# Patient Record
Sex: FEMALE | Race: WHITE | NOT HISPANIC OR LATINO | Employment: UNEMPLOYED | URBAN - METROPOLITAN AREA
[De-identification: names, ages, dates, MRNs, and addresses within clinical notes are randomized per-mention and may not be internally consistent; named-entity substitution may affect disease eponyms.]

---

## 2022-07-28 ENCOUNTER — OFFICE VISIT (OUTPATIENT)
Dept: FAMILY MEDICINE CLINIC | Facility: CLINIC | Age: 32
End: 2022-07-28
Payer: COMMERCIAL

## 2022-07-28 VITALS
SYSTOLIC BLOOD PRESSURE: 110 MMHG | OXYGEN SATURATION: 98 % | WEIGHT: 248.8 LBS | DIASTOLIC BLOOD PRESSURE: 80 MMHG | HEIGHT: 68 IN | RESPIRATION RATE: 12 BRPM | BODY MASS INDEX: 37.71 KG/M2 | HEART RATE: 80 BPM

## 2022-07-28 DIAGNOSIS — E66.9 OBESITY (BMI 30-39.9): Primary | ICD-10-CM

## 2022-07-28 DIAGNOSIS — Z12.4 SCREENING FOR CERVICAL CANCER: ICD-10-CM

## 2022-07-28 PROCEDURE — 99204 OFFICE O/P NEW MOD 45 MIN: CPT | Performed by: INTERNAL MEDICINE

## 2022-07-28 PROCEDURE — 3725F SCREEN DEPRESSION PERFORMED: CPT | Performed by: INTERNAL MEDICINE

## 2022-07-28 NOTE — PATIENT INSTRUCTIONS
Find time for yourself  Start to walk at least 3 times per week 50 minutes with heart rate of 150  Limit your carbohydrates by 50%  Pasta, rice, bread, potato  No grapes  No sweets  Ice cream only on weekend for pleasure  Cannot eat 3 hours before you go to bed  In six-month your goal should be 220 lb

## 2022-07-28 NOTE — PROGRESS NOTES
Assessment/Plan:    No problem-specific Assessment & Plan notes found for this encounter  Diagnoses and all orders for this visit:    Obesity (BMI 30-39 9)  -     Comprehensive metabolic panel; Future  -     TSH, 3rd generation with Free T4 reflex; Future  -     CBC and differential; Future  -     Lipid panel; Future  -     Vitamin D 25 hydroxy; Future  -     UA (URINE) with reflex to Scope  -     HEMOGLOBIN A1C W/ EAG ESTIMATION; Future    Screening for cervical cancer  -     Ambulatory Referral to Gynecology; Future          Subjective:      Patient ID: Gwendolyn Ventura is a 33 y o  female  Patient came today to establish care  She does not have any medical conditions  She does not smoke, drinks alcohol socially  She is up-to-date on her vaccinations  Her vital signs are okay except of elevated BMI  She would like to establish care with OBGYN, referral was given  She has 2 kids, she is currently breast-feeding her 3years old  The following portions of the patient's history were reviewed and updated as appropriate: allergies, current medications, past family history, past medical history, past social history, past surgical history, and problem list     Review of Systems   Constitutional: Negative for activity change, appetite change, chills, fatigue and fever  HENT: Negative for congestion, ear pain, rhinorrhea and sore throat  Respiratory: Negative for cough, shortness of breath and wheezing  Cardiovascular: Negative for chest pain, palpitations and leg swelling  Gastrointestinal: Negative for abdominal distention, abdominal pain, diarrhea, nausea and vomiting  Genitourinary: Negative for difficulty urinating, frequency and pelvic pain  Musculoskeletal: Negative for arthralgias, back pain and neck pain  Skin: Negative for rash  Neurological: Negative for dizziness, tremors, weakness, numbness and headaches           Objective:      /80 (BP Location: Left arm, Patient Position: Sitting, Cuff Size: Standard)   Pulse 80   Resp 12   Ht 5' 8" (1 727 m)   Wt 113 kg (248 lb 12 8 oz)   SpO2 98%   BMI 37 83 kg/m²     Allergies   Allergen Reactions    Other Swelling     Whole wheat/grain products - can have regular white flour/grains        No current outpatient medications on file  Patient Instructions   Find time for yourself  Start to walk at least 3 times per week 50 minutes with heart rate of 150  Limit your carbohydrates by 50%  Pasta, rice, bread, potato  No grapes  No sweets  Ice cream only on weekend for pleasure  Cannot eat 3 hours before you go to bed  In six-month your goal should be 220 lb  Physical Exam  Constitutional:       General: She is not in acute distress  Appearance: Normal appearance  She is not ill-appearing  HENT:      Nose: No rhinorrhea  Cardiovascular:      Rate and Rhythm: Normal rate and regular rhythm  Heart sounds: No murmur heard  No friction rub  No gallop  Pulmonary:      Effort: No respiratory distress  Breath sounds: No wheezing or rhonchi  Chest:      Chest wall: No tenderness  Abdominal:      General: There is no distension  Palpations: There is no mass  Tenderness: There is no abdominal tenderness  There is no guarding or rebound  Hernia: No hernia is present  Musculoskeletal:         General: No swelling or tenderness  Lymphadenopathy:      Cervical: No cervical adenopathy  Skin:     Coloration: Skin is not jaundiced  Findings: No rash  Neurological:      Mental Status: She is alert and oriented to person, place, and time  Motor: No weakness        Gait: Gait normal    Psychiatric:         Mood and Affect: Mood normal          Behavior: Behavior normal

## 2023-03-28 PROBLEM — F41.9 ANXIETY: Status: ACTIVE | Noted: 2020-06-10

## 2023-03-28 NOTE — PROGRESS NOTES
"Subjective Margaret Dancer is a 33 y o  female who presents for annual well woman exam   Last Pap smear 10/13/18 NILM  Periods are regular every 28-30 days, lasting 5-7 days  Heavy for about 3 to 4 days  Changes menstrual cup about 2-3 times per day  Admits to increased fatigue, cramping and feels like she always gets sick during menses  No intermenstrual bleeding, spotting, or discharge  Current contraception: condoms  History of abnormal Pap smear: no  Family history of uterine or ovarian cancer: no  Regular self breast exam: no  History of abnormal mammogram: to begin at age 37 unless otherwise clinically indicated   Family history of breast cancer: yes - Mother BRCA positive unsure 1 or 2  History of abnormal lipids: no  Gardasil vaccine: Uncertain    Menstrual History:  OB History        3    Para   2    Term   2            AB   1    Living   2       SAB   1    IAB        Ectopic        Multiple        Live Births   2                Menarche age: 7  Patient's last menstrual period was 2023 (approximate)  Period Cycle (Days):  (monthly)  Period Duration (Days): 5-7  Period Pattern: Regular  Menstrual Flow: Heavy, Moderate (heavy for 3-4)  Menstrual Control: Other (Comment) (cup)  Menstrual Control Change Freq (Hours): cup-2-3 times a day  Dysmenorrhea: (!) Mild  Dysmenorrhea Symptoms: Headache, Cramping, Other (Comment)    The following portions of the patient's history were reviewed and updated as appropriate: allergies, current medications, past family history, past medical history, past social history, past surgical history and problem list     Review of Systems  Pertinent items are noted in HPI        Objective      /80   Ht 5' 8\" (1 727 m)   Wt 114 kg (251 lb)   LMP 2023 (Approximate)   BMI 38 16 kg/m²     General:   alert and oriented, in no acute distress, alert, morbidly obese, appears stated age and cooperative   Heart: regular rate and rhythm, " S1, S2 normal, no murmur, click, rub or gallop   Lungs: clear to auscultation bilaterally   Abdomen: soft, non-tender, without masses or organomegaly   Vulva: normal, Bartholin's, Urethra, [place]'s normal   Vagina: normal mucosa, normal discharge, no palpable nodules   Cervix: no bleeding following Pap, no cervical motion tenderness and no lesions   Uterus: Nontender difficult to assess due to body habitus   Adnexa: Nontender/difficult to assess due to body habitus   Breast: breasts appear normal, no suspicious masses, no skin or nipple changes or axillary nodes  Assessment      @well woman  no contraindication to begin hormonal therapy@   Plan      All questions answered  Await pap smear results  Blood tests: CBC with diff, TSH and Iron panel   Breast self exam technique reviewed and patient encouraged to perform self-exam monthly  Contraception: condoms  Diagnosis explained in detail, including differential   Dietary diary  Discussed healthy lifestyle modifications  Educational material distributed  Follow up in 1 Year for annual exam   Follow up as needed  Pelvic ultrasound  Thin prep Pap smear  Breast awareness reviewed    Family history of breast cancer-mother BRCA positive  Sister and brother BRCA positive  Information provided on genetic screening  Menorrhagia-pelvic ultrasound and labs ordered  Patient encouraged to complete labs and ultrasound  Reviewed options for menstrual control  Patient is most interested in NuvaRing  Rx NuvaRing 1 ring should remain in place for 3 consecutive weeks remove for fourth week for menses  Common side effects reviewed written information provided  We will follow-up in office in 3 to 4 months for symptom check  BMI 38 referral placed to weight management program  Encouraged healthy diet, exercise and lifestyle  Encouraged follow-up with PCP as needed  Encouraged seasonal influenza vaccination  Gardasil vaccine series reviewed    Written information provided  Encouraged social distancing, good hand hygiene, avoidance of crowds and masking  Written information provided about COVID-19    Will call/ PrintToPeerhart message  with results  VBI-    BMI Counseling: Body mass index is 38 16 kg/m²  The BMI is above normal  Nutrition recommendations include reducing portion sizes, decreasing overall calorie intake and 3-5 servings of fruits/vegetables daily  Exercise recommendations include exercising 3-5 times per week, joining a gym and strength training exercises  Patient referred to weight management due to patient being morbidly obese

## 2023-03-29 ENCOUNTER — APPOINTMENT (OUTPATIENT)
Dept: LAB | Facility: MEDICAL CENTER | Age: 33
End: 2023-03-29

## 2023-03-29 ENCOUNTER — OFFICE VISIT (OUTPATIENT)
Dept: OBGYN CLINIC | Facility: MEDICAL CENTER | Age: 33
End: 2023-03-29

## 2023-03-29 VITALS
WEIGHT: 251 LBS | HEIGHT: 68 IN | DIASTOLIC BLOOD PRESSURE: 80 MMHG | SYSTOLIC BLOOD PRESSURE: 122 MMHG | BODY MASS INDEX: 38.04 KG/M2

## 2023-03-29 DIAGNOSIS — Z30.8 ENCOUNTER FOR OTHER CONTRACEPTIVE MANAGEMENT: ICD-10-CM

## 2023-03-29 DIAGNOSIS — N92.0 MENORRHAGIA WITH REGULAR CYCLE: ICD-10-CM

## 2023-03-29 DIAGNOSIS — Z01.419 WELL WOMAN EXAM WITH ROUTINE GYNECOLOGICAL EXAM: Primary | ICD-10-CM

## 2023-03-29 PROBLEM — L98.9 SCALP LESION: Status: ACTIVE | Noted: 2023-03-29

## 2023-03-29 LAB
BASOPHILS # BLD AUTO: 0.04 THOUSANDS/ÂΜL (ref 0–0.1)
BASOPHILS NFR BLD AUTO: 1 % (ref 0–1)
EOSINOPHIL # BLD AUTO: 0.1 THOUSAND/ÂΜL (ref 0–0.61)
EOSINOPHIL NFR BLD AUTO: 1 % (ref 0–6)
ERYTHROCYTE [DISTWIDTH] IN BLOOD BY AUTOMATED COUNT: 17.8 % (ref 11.6–15.1)
FERRITIN SERPL-MCNC: 126 NG/ML (ref 8–388)
HCT VFR BLD AUTO: 34.7 % (ref 34.8–46.1)
HGB BLD-MCNC: 10.3 G/DL (ref 11.5–15.4)
IMM GRANULOCYTES # BLD AUTO: 0.03 THOUSAND/UL (ref 0–0.2)
IMM GRANULOCYTES NFR BLD AUTO: 0 % (ref 0–2)
LYMPHOCYTES # BLD AUTO: 2.55 THOUSANDS/ÂΜL (ref 0.6–4.47)
LYMPHOCYTES NFR BLD AUTO: 30 % (ref 14–44)
MCH RBC QN AUTO: 18.5 PG (ref 26.8–34.3)
MCHC RBC AUTO-ENTMCNC: 29.7 G/DL (ref 31.4–37.4)
MCV RBC AUTO: 62 FL (ref 82–98)
MONOCYTES # BLD AUTO: 0.5 THOUSAND/ÂΜL (ref 0.17–1.22)
MONOCYTES NFR BLD AUTO: 6 % (ref 4–12)
NEUTROPHILS # BLD AUTO: 5.39 THOUSANDS/ÂΜL (ref 1.85–7.62)
NEUTS SEG NFR BLD AUTO: 62 % (ref 43–75)
NRBC BLD AUTO-RTO: 0 /100 WBCS
PLATELET # BLD AUTO: 312 THOUSANDS/UL (ref 149–390)
PMV BLD AUTO: 10.8 FL (ref 8.9–12.7)
RBC # BLD AUTO: 5.57 MILLION/UL (ref 3.81–5.12)
TSH SERPL DL<=0.05 MIU/L-ACNC: 3.66 UIU/ML (ref 0.45–4.5)
WBC # BLD AUTO: 8.61 THOUSAND/UL (ref 4.31–10.16)

## 2023-03-29 RX ORDER — ETONOGESTREL AND ETHINYL ESTRADIOL 11.7; 2.7 MG/1; MG/1
INSERT, EXTENDED RELEASE VAGINAL
Qty: 3 EACH | Refills: 1 | Status: SHIPPED | OUTPATIENT
Start: 2023-03-29

## 2023-03-31 LAB
HPV HR 12 DNA CVX QL NAA+PROBE: NEGATIVE
HPV16 DNA CVX QL NAA+PROBE: NEGATIVE
HPV18 DNA CVX QL NAA+PROBE: NEGATIVE
IRON SATN MFR SERPL: 29 % (ref 15–50)
IRON SERPL-MCNC: 90 UG/DL (ref 50–170)
TIBC SERPL-MCNC: 309 UG/DL (ref 250–450)

## 2023-04-07 LAB
LAB AP GYN PRIMARY INTERPRETATION: NORMAL
LAB AP LMP: NORMAL
Lab: NORMAL

## 2023-06-26 NOTE — PROGRESS NOTES
"Assessment/Plan:      Diagnoses and all orders for this visit:    Encounter for surveillance of vaginal ring hormonal contraceptive device  -     etonogestrel-ethinyl estradiol (NuvaRing) 0 12-0 015 MG/24HR vaginal ring; Insert vaginally and leave in place for 3 consecutive weeks, then remove for 1 week  Menorrhagia with regular cycle  -     etonogestrel-ethinyl estradiol (NuvaRing) 0 12-0 015 MG/24HR vaginal ring; Insert vaginally and leave in place for 3 consecutive weeks, then remove for 1 week  - reviewed use of nuvaring  Written information provided  - reviewed menorrhagia and treatment options  - encouraged to complete pelvic US  -Signs and symptoms to report reviewed    RTO 3/2024 for annual exam     Subjective:     Patient ID: Benjamin Jiménez is a 29 y o  female  HPI  here to follow up menorrhagia  LMP 23  Was seen 2023 with complaints of heavy menstrual bleeding  Started Nuvaring  Menses are every 3 months lasting 5 to 7 days  Bleeding is described as lighter  Is satisfied and desires to continue  Has not completed pelvic US  Last pap smear 3/29/23 NILM/ HR HPV(-)       Review of Systems   Constitutional: Negative for chills, fatigue and fever  Respiratory: Negative  Cardiovascular: Negative  Genitourinary: Negative  Neurological: Negative for headaches  Objective:  /74   Ht 5' 8\" (1 727 m)   Wt 110 kg (243 lb 6 4 oz)   LMP 2023 (Exact Date) Comment: nuvaring  BMI 37 01 kg/m²      Physical Exam  Vitals reviewed  Constitutional:       Appearance: Normal appearance  She is obese  Neurological:      Mental Status: She is alert and oriented to person, place, and time     Psychiatric:         Mood and Affect: Mood normal          Behavior: Behavior normal          "

## 2023-06-28 ENCOUNTER — OFFICE VISIT (OUTPATIENT)
Dept: OBGYN CLINIC | Facility: MEDICAL CENTER | Age: 33
End: 2023-06-28
Payer: COMMERCIAL

## 2023-06-28 VITALS
WEIGHT: 243.4 LBS | BODY MASS INDEX: 36.89 KG/M2 | SYSTOLIC BLOOD PRESSURE: 126 MMHG | HEIGHT: 68 IN | DIASTOLIC BLOOD PRESSURE: 74 MMHG

## 2023-06-28 DIAGNOSIS — Z30.44 ENCOUNTER FOR SURVEILLANCE OF VAGINAL RING HORMONAL CONTRACEPTIVE DEVICE: ICD-10-CM

## 2023-06-28 DIAGNOSIS — N92.0 MENORRHAGIA WITH REGULAR CYCLE: ICD-10-CM

## 2023-06-28 PROCEDURE — 99213 OFFICE O/P EST LOW 20 MIN: CPT | Performed by: NURSE PRACTITIONER

## 2023-06-28 RX ORDER — ETONOGESTREL AND ETHINYL ESTRADIOL 11.7; 2.7 MG/1; MG/1
INSERT, EXTENDED RELEASE VAGINAL
Qty: 3 EACH | Refills: 3 | Status: SHIPPED | OUTPATIENT
Start: 2023-06-28

## 2023-10-03 ENCOUNTER — TELEPHONE (OUTPATIENT)
Dept: OBGYN CLINIC | Facility: MEDICAL CENTER | Age: 33
End: 2023-10-03

## 2023-10-03 NOTE — TELEPHONE ENCOUNTER
Discussed with provider and patient does not have to remove her nuvaring. Patient aware. Patient advised to call back with any questions or any concerns. Bleeding precautions reviewed.

## 2023-10-03 NOTE — TELEPHONE ENCOUNTER
Patient called stating that she is having a "mini period". Started 3 days ago. She would like to know if she should remove the Nuva Ring?  Please advise

## 2023-11-13 ENCOUNTER — OFFICE VISIT (OUTPATIENT)
Dept: URGENT CARE | Facility: MEDICAL CENTER | Age: 33
End: 2023-11-13
Payer: COMMERCIAL

## 2023-11-13 ENCOUNTER — APPOINTMENT (OUTPATIENT)
Dept: RADIOLOGY | Facility: MEDICAL CENTER | Age: 33
End: 2023-11-13
Payer: COMMERCIAL

## 2023-11-13 VITALS
HEIGHT: 68 IN | WEIGHT: 232.2 LBS | BODY MASS INDEX: 35.19 KG/M2 | HEART RATE: 87 BPM | DIASTOLIC BLOOD PRESSURE: 91 MMHG | TEMPERATURE: 98 F | OXYGEN SATURATION: 100 % | SYSTOLIC BLOOD PRESSURE: 146 MMHG
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
## 2023-11-13 DIAGNOSIS — R05.9 COUGH, UNSPECIFIED TYPE: ICD-10-CM

## 2023-11-13 DIAGNOSIS — J45.909 REACTIVE AIRWAY DISEASE WITHOUT COMPLICATION, UNSPECIFIED ASTHMA SEVERITY, UNSPECIFIED WHETHER PERSISTENT: ICD-10-CM

## 2023-11-13 PROCEDURE — 71046 X-RAY EXAM CHEST 2 VIEWS: CPT

## 2023-11-13 PROCEDURE — 99213 OFFICE O/P EST LOW 20 MIN: CPT | Performed by: FAMILY MEDICINE

## 2023-11-13 RX ORDER — BENZONATATE 200 MG/1
200 CAPSULE ORAL 3 TIMES DAILY PRN
Qty: 20 CAPSULE | Refills: 0 | Status: SHIPPED | OUTPATIENT
Start: 2023-11-13

## 2023-11-13 RX ORDER — PREDNISONE 20 MG/1
TABLET ORAL
Qty: 18 TABLET | Refills: 0 | Status: SHIPPED | OUTPATIENT
Start: 2023-11-13

## 2023-11-13 RX ORDER — ALBUTEROL SULFATE 90 UG/1
2 AEROSOL, METERED RESPIRATORY (INHALATION) EVERY 6 HOURS PRN
Qty: 19 G | Refills: 0 | Status: SHIPPED | OUTPATIENT
Start: 2023-11-13 | End: 2023-11-16 | Stop reason: SDUPTHER

## 2023-11-14 NOTE — PROGRESS NOTES
[facility]        NAME: Shakir Simon is a 29 y.o. female  : 1990    MRN: [id number]  DATE: 2023  TIME: 8:11 PM    Assessment and Plan   Cat bite, initial encounter [W55.01XA]  1. Cat bite, initial encounter        2. Cough, unspecified type  XR chest pa & lateral            Patient Instructions       Follow up with PCP in 3-5 days. Proceed to  ER if symptoms worsen. Chief Complaint     Chief Complaint   Patient presents with    Cough     Patient reports pain in her chest when she coughs, fatigue, and dizziness. History of Present Illness       69-year-old female here today with complaint of cough for the past week. Complaining of pleuritic chest pain. Cough is predominant worse at night. Denies any fever. Complaining of extreme fatigue in the last 3 days. She has been taking Robitussin and recent switch to Mucinex which has offered no significant improvement. Refers to minimal nasal congestion denies postnasal drip. Denies nausea, vomiting, diarrhea. However, she informs she has sick exposure to her children have been sick recently. She performed a COVID test at home today which was negative. Currently immunized against COVID but has not received a booster shot this year. She is also not received flu vaccine this season. Complaining of generalized fatigue. Denies wheezing but complains of severe shortness of breath. Review of Systems   Review of Systems   Constitutional:  Positive for fatigue. HENT:  Positive for congestion. Negative for postnasal drip. Respiratory:  Positive for cough and shortness of breath. Neurological:  Positive for headaches.          Current Medications       Current Outpatient Medications:     diphenhydrAMINE HCl (BENADRYL ALLERGY PO), Take by mouth as needed, Disp: , Rfl:     etonogestrel-ethinyl estradiol (NuvaRing) 0.12-0.015 MG/24HR vaginal ring, Insert vaginally and leave in place for 3 consecutive weeks, then remove for 1 week., Disp: 3 each, Rfl: 3    fluticasone (FLONASE) 50 mcg/act nasal spray, SPRAY 1 SPRAY INTO EACH NOSTRIL EVERY DAY, Disp: 48 mL, Rfl: 3    mometasone (ELOCON) 0.1 % lotion, Apply topically daily, Disp: 60 mL, Rfl: 1    Auvi-Q 0.3 MG/0.3ML SOAJ, Inject 0.3 mL (0.3 mg total) into a muscle once for 1 dose, Disp: 6 each, Rfl: 0    Current Allergies     Allergies as of 11/13/2023 - Reviewed 11/13/2023   Allergen Reaction Noted    Other Swelling 06/22/2018            The following portions of the patient's history were reviewed and updated as appropriate: allergies, current medications, past family history, past medical history, past social history, past surgical history and problem list.     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[address])        Past Surgical History:   Procedure Laterality Date    MOLE REMOVAL         Family History   Problem Relation Age of Onset    Breast cancer Mother         BRCA +    Diabetes Mother     Rashes / Skin problems Mother         sores d/t scratching    Allergy (severe) Mother         food and env    Depression Father     Bipolar disorder Father     Polycystic ovary syndrome Sister     Anxiety disorder Sister     Other Sister         BRCA +    Allergies Sister         soy allergy    Diabetes Brother     Bipolar disorder Brother     Other Brother         BRCA +    Autism Brother     Allergies Brother         food and env. Asthma Brother     Stroke Maternal Grandmother     Heart disease Paternal Grandmother     Asthma Son     Allergies Son     Colon cancer Neg Hx     Ovarian cancer Neg Hx          Medications have been verified. Objective   /91   Pulse 87   Temp 98 °F (36.7 °C)   Ht 5' 8" (1.727 m)   Wt 105 kg (232 lb 3.2 oz)   SpO2 100%   BMI 35.31 kg/m²   No LMP recorded. (Menstrual status: Birth Control). Physical Exam     Physical Exam  Vitals and nursing note reviewed. Constitutional:       Appearance: She is ill-appearing. HENT:      Nose:      Comments: Erythematous mildly hypertrophic right turbinate. Mouth/Throat:      Mouth: Mucous membranes are moist.      Pharynx: Oropharynx is clear. Pulmonary:      Effort: Pulmonary effort is normal.      Comments: Decreased breath sounds. Musculoskeletal:      Cervical back: Normal range of motion and neck supple. Neurological:      Mental Status: She is alert. - - -

## 2023-11-14 NOTE — PATIENT INSTRUCTIONS
Chest x-ray reveals no infiltrate effusion or consolidation. Official radiology interpretation is pending. I prescribed Proventil inhaler 2 puffs every 6 hours for 7 days then use as needed, prednisone tapering from 60 down to 20 mg over 9 days, Tessalon Perles 200 mg every 8 hour for cough suppression. For now, continue with Mucinex for expectoration. Follow-up with primary care provider if symptoms persist or worsen. Asthma   WHAT YOU NEED TO KNOW:   Asthma is a lung disease that makes breathing difficult. Chronic inflammation and reactions to triggers narrow the airways in the lungs. Asthma can become life-threatening if it is not managed. DISCHARGE INSTRUCTIONS:   Call your local emergency number (911 in the [address]) if:   You have severe shortness of breath. The skin around your neck and ribs pulls in with each breath. Your peak flow numbers are in the red zone of your AAP. Return to the emergency department if:   You have shortness of breath, even after you take your short-term medicine as directed. Your lips or nails turn blue or gray. Call your doctor or asthma specialist if:   You run out of medicine before your next refill is due. Your symptoms get worse. You need to take more medicine than usual to control your symptoms. You have questions or concerns about your condition or care. Medicines:   Medicines  may be used to decrease inflammation, open airways, and make it easier to breathe. Medicines may be inhaled, taken as a pill, or injected. Short-term medicines relieve your symptoms quickly. Long-term medicines are used to prevent future asthma attacks. Other medicines may be needed if your regular medicines are not able to prevent attacks. You may also need medicine to help control your allergies. Ask your healthcare provider for more information about any medicine you are given and how to take it safely. Take your medicine as directed.   Contact your healthcare provider if you think your medicine is not helping or if you have side effects. Tell your provider if you are allergic to any medicine. Keep a list of the medicines, vitamins, and herbs you take. Include the amounts, and when and why you take them. Bring the list or the pill bottles to follow-up visits. Carry your medicine list with you in case of an emergency. Manage your symptoms and prevent future attacks: Follow your Asthma Action Plan (AAP). This is a written plan that you and your healthcare provider create. It explains which medicine you need and when to change doses if necessary. It also explains how you can monitor symptoms and use a peak flow meter. The meter measures how well your lungs are working. Manage other health conditions , such as allergies, acid reflux, and sleep apnea. Identify and avoid triggers. These may include pets, dust mites, mold, and cockroaches. Do not smoke or be around others who smoke. Nicotine and other chemicals in cigarettes and cigars can cause lung damage. Ask your healthcare provider for information if you currently smoke and need help to quit. E-cigarettes or smokeless tobacco still contain nicotine. Talk to your healthcare provider before you use these products. Ask about the flu vaccine. The flu can make your asthma worse. You may need a yearly flu shot. Follow up with your healthcare provider as directed: You will need to return to make sure your medicine is working and your symptoms are controlled. You may be referred to an asthma or allergy specialist. Deisy Lee may be asked to keep a record of your peak flow values and bring it with you to your appointments. Write down your questions so you remember to ask them during your visits. © Copyright Leonidas Dudley 2023 Information is for End User's use only and may not be sold, redistributed or otherwise used for commercial purposes. The above information is an  only.  It is not intended as medical advice for individual conditions or treatments. Talk to your doctor, nurse or pharmacist before following any medical regimen to see if it is safe and effective for you.

## 2024-01-29 ENCOUNTER — OFFICE VISIT (OUTPATIENT)
Dept: FAMILY MEDICINE CLINIC | Facility: CLINIC | Age: 34
End: 2024-01-29
Payer: COMMERCIAL

## 2024-01-29 VITALS — DIASTOLIC BLOOD PRESSURE: 76 MMHG | OXYGEN SATURATION: 98 % | HEART RATE: 92 BPM | SYSTOLIC BLOOD PRESSURE: 132 MMHG

## 2024-01-29 DIAGNOSIS — J01.00 ACUTE NON-RECURRENT MAXILLARY SINUSITIS: Primary | ICD-10-CM

## 2024-01-29 PROBLEM — J45.20 MILD INTERMITTENT ASTHMA WITHOUT COMPLICATION: Status: ACTIVE | Noted: 2024-01-29

## 2024-01-29 PROCEDURE — 99213 OFFICE O/P EST LOW 20 MIN: CPT | Performed by: FAMILY MEDICINE

## 2024-01-29 RX ORDER — BUDESONIDE AND FORMOTEROL FUMARATE DIHYDRATE 160; 4.5 UG/1; UG/1
2 AEROSOL RESPIRATORY (INHALATION) 2 TIMES DAILY
COMMUNITY
End: 2024-02-01

## 2024-01-29 RX ORDER — AMOXICILLIN AND CLAVULANATE POTASSIUM 875; 125 MG/1; MG/1
1 TABLET, FILM COATED ORAL EVERY 12 HOURS SCHEDULED
Qty: 14 TABLET | Refills: 0 | Status: SHIPPED | OUTPATIENT
Start: 2024-01-29 | End: 2024-02-05

## 2024-01-29 NOTE — PATIENT INSTRUCTIONS
1. Acute non-recurrent maxillary sinusitis  -     amoxicillin-clavulanate (AUGMENTIN) 875-125 mg per tablet; Take 1 tablet by mouth every 12 (twelve) hours for 7 days     Start antibiotics and flonase call if symptoms do not resolve.

## 2024-01-29 NOTE — PROGRESS NOTES
Assessment/Plan:       Problem List Items Addressed This Visit    None  Visit Diagnoses       Acute non-recurrent maxillary sinusitis    -  Primary    Relevant Medications    amoxicillin-clavulanate (AUGMENTIN) 875-125 mg per tablet          1. Acute non-recurrent maxillary sinusitis  Start augmentin and flonase, follow up if symptoms not resolving.    - amoxicillin-clavulanate (AUGMENTIN) 875-125 mg per tablet; Take 1 tablet by mouth every 12 (twelve) hours for 7 days  Dispense: 14 tablet; Refill: 0      Subjective:      Patient ID: Elena Augustine is a 33 y.o. female.    HPI    33 year old presenting for ear pain, congestion for about 6 weeks off and on now worsening.    Feels as if ears are underwater.    Has been afebrile.    The following portions of the patient's history were reviewed and updated as appropriate: allergies, current medications, past family history, past medical history, past social history, past surgical history and problem list.      Current Outpatient Medications:     albuterol (PROVENTIL HFA,VENTOLIN HFA) 90 mcg/act inhaler, Inhale 2 puffs every 4 (four) hours as needed for wheezing, Disp: 19 g, Rfl: 0    amoxicillin-clavulanate (AUGMENTIN) 875-125 mg per tablet, Take 1 tablet by mouth every 12 (twelve) hours for 7 days, Disp: 14 tablet, Rfl: 0    budesonide-formoterol (SYMBICORT) 160-4.5 mcg/act inhaler, Inhale 2 puffs 2 (two) times a day Rinse mouth after use., Disp: , Rfl:     etonogestrel-ethinyl estradiol (NuvaRing) 0.12-0.015 MG/24HR vaginal ring, Insert vaginally and leave in place for 3 consecutive weeks, then remove for 1 week., Disp: 3 each, Rfl: 3    Spacer/Aero-Holding Chambers (Vortex Valved Holding Chamber) WAQAS, Use 2 (two) times a day, Disp: 1 each, Rfl: 0    Auvi-Q 0.3 MG/0.3ML SOAJ, Inject 0.3 mL (0.3 mg total) into a muscle once for 1 dose (Patient not taking: Reported on 1/29/2024), Disp: 6 each, Rfl: 0    benzonatate (TESSALON) 200 MG capsule, Take 1 capsule (200 mg  total) by mouth 3 (three) times a day as needed for cough (Patient not taking: Reported on 1/29/2024), Disp: 20 capsule, Rfl: 0    diphenhydrAMINE HCl (BENADRYL ALLERGY PO), Take by mouth as needed (Patient not taking: Reported on 1/29/2024), Disp: , Rfl:     fluticasone (FLONASE) 50 mcg/act nasal spray, SPRAY 1 SPRAY INTO EACH NOSTRIL EVERY DAY (Patient not taking: Reported on 11/16/2023), Disp: 48 mL, Rfl: 3    Fluticasone-Salmeterol (Wixela Inhub) 250-50 mcg/dose inhaler, Inhale 1 puff 2 (two) times a day as needed (cough, wheeze) (Patient not taking: Reported on 1/29/2024), Disp: 60 blister, Rfl: 1    mometasone (ELOCON) 0.1 % lotion, Apply topically daily (Patient not taking: Reported on 1/29/2024), Disp: 60 mL, Rfl: 1    predniSONE 20 mg tablet, 3 tablets once daily day 1 through 3 then 2 tablets daily day 4 through 6 then 1 tablet daily day 7 through 9. (Patient not taking: Reported on 1/29/2024), Disp: 18 tablet, Rfl: 0     Review of Systems   Constitutional:  Negative for activity change and appetite change.   Respiratory:  Negative for apnea and chest tightness.    Cardiovascular:  Negative for chest pain and palpitations.   Gastrointestinal:  Negative for abdominal distention and abdominal pain.   Musculoskeletal:  Negative for arthralgias and back pain.         Objective:      /76 (BP Location: Left arm, Patient Position: Sitting, Cuff Size: Large)   Pulse 92   SpO2 98%          Physical Exam  Constitutional:       Appearance: She is well-developed.   HENT:      Right Ear: Tympanic membrane and ear canal normal.      Left Ear: Tympanic membrane and ear canal normal.      Nose: Congestion and rhinorrhea present.      Right Sinus: Maxillary sinus tenderness present. No frontal sinus tenderness.      Left Sinus: Maxillary sinus tenderness present. No frontal sinus tenderness.   Cardiovascular:      Rate and Rhythm: Normal rate and regular rhythm.   Pulmonary:      Effort: Pulmonary effort is normal.    Neurological:      Mental Status: She is alert.           Adam Huston MD

## 2024-02-28 ENCOUNTER — TELEPHONE (OUTPATIENT)
Dept: RHEUMATOLOGY | Facility: CLINIC | Age: 34
End: 2024-02-28

## 2024-03-19 ENCOUNTER — OFFICE VISIT (OUTPATIENT)
Dept: RHEUMATOLOGY | Facility: CLINIC | Age: 34
End: 2024-03-19
Payer: COMMERCIAL

## 2024-03-19 ENCOUNTER — APPOINTMENT (OUTPATIENT)
Dept: LAB | Facility: CLINIC | Age: 34
End: 2024-03-19
Payer: COMMERCIAL

## 2024-03-19 VITALS
DIASTOLIC BLOOD PRESSURE: 82 MMHG | WEIGHT: 227 LBS | HEIGHT: 68 IN | HEART RATE: 91 BPM | OXYGEN SATURATION: 99 % | BODY MASS INDEX: 34.4 KG/M2 | SYSTOLIC BLOOD PRESSURE: 126 MMHG

## 2024-03-19 DIAGNOSIS — K90.0 CELIAC DISEASE: Primary | ICD-10-CM

## 2024-03-19 DIAGNOSIS — K90.0 CELIAC DISEASE: ICD-10-CM

## 2024-03-19 DIAGNOSIS — M25.50 ARTHRALGIA, UNSPECIFIED JOINT: ICD-10-CM

## 2024-03-19 PROCEDURE — 86705 HEP B CORE ANTIBODY IGM: CPT

## 2024-03-19 PROCEDURE — 86704 HEP B CORE ANTIBODY TOTAL: CPT

## 2024-03-19 PROCEDURE — 86803 HEPATITIS C AB TEST: CPT

## 2024-03-19 PROCEDURE — 99204 OFFICE O/P NEW MOD 45 MIN: CPT | Performed by: INTERNAL MEDICINE

## 2024-03-19 PROCEDURE — 86200 CCP ANTIBODY: CPT

## 2024-03-19 PROCEDURE — 86430 RHEUMATOID FACTOR TEST QUAL: CPT

## 2024-03-19 PROCEDURE — 36415 COLL VENOUS BLD VENIPUNCTURE: CPT

## 2024-03-19 PROCEDURE — 86364 TISS TRNSGLTMNASE EA IG CLAS: CPT

## 2024-03-19 PROCEDURE — 87340 HEPATITIS B SURFACE AG IA: CPT

## 2024-03-19 NOTE — PROGRESS NOTES
"  This is a Rheumatology Consult seen at the request of Dr. House      HPI: Elena Augustine is a 32 y/o female who presents for further evaluation chronic arthralgias. She has past medical history food allergy ( wheat allergy)    Onset of symptoms since her 20s    She has history food allergy, possibly wheat allergy with \"throat closing\". She does not have reaction with processed wheat, however she reacts to whole wheat products. Was rxd Epi Pen    Also develops scalp lesions, diarrhea, arthralgias associated with wheat products    Seasonal allergic rhinitis, Rxd with flonase    She has stopped gluten and has noticed improvement diarrhea, rash    Arthralgias are somewhat better                  --------------------------------------------------------------------------------------------------------        ROS:        All other ROS was reviewed and negative except as above         --------------------------------------------------------------------------------------------------------    Past Medical History    Past Medical History:   Diagnosis Date    Blood in urine     Diabetes (HCC)     in pregnancy    Food intolerance     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)            Past Surgical History    Past Surgical History:   Procedure Laterality Date    MOLE REMOVAL             Family History    Family History   Problem Relation Age of Onset    Breast cancer Mother         BRCA +    Diabetes Mother     Rashes / Skin problems Mother         sores d/t scratching    Allergy (severe) Mother         food and env    Depression Father     Bipolar disorder Father     Polycystic ovary syndrome Sister     Anxiety disorder Sister     Other Sister         BRCA +    Allergies Sister         soy allergy    Diabetes Brother     Bipolar disorder Brother     Other Brother         BRCA +    Autism Brother     Allergies Brother         food and env.    Asthma Brother     Stroke Maternal Grandmother     " "Heart disease Paternal Grandmother     Asthma Son     Allergies Son     Colon cancer Neg Hx     Ovarian cancer Neg Hx             Social History    Social History     Tobacco Use    Smoking status: Never    Smokeless tobacco: Never   Vaping Use    Vaping status: Never Used   Substance Use Topics    Alcohol use: Yes     Comment: social    Drug use: Never     Currently not working outside home    Allergies    Allergies   Allergen Reactions    Other Swelling     Whole wheat/grain products - can have regular white flour/grains         Medications    Current Outpatient Medications   Medication Instructions    albuterol (PROVENTIL HFA,VENTOLIN HFA) 90 mcg/act inhaler 2 puffs, Inhalation, Every 4 hours PRN    Auvi-Q 0.3 mg, Intramuscular, Once    benzonatate (TESSALON) 200 mg, Oral, 3 times daily PRN    diphenhydrAMINE HCl (BENADRYL ALLERGY PO) Oral, As needed    etonogestrel-ethinyl estradiol (NuvaRing) 0.12-0.015 MG/24HR vaginal ring Insert vaginally and leave in place for 3 consecutive weeks, then remove for 1 week.    fluticasone (FLONASE) 50 mcg/act nasal spray SPRAY 1 SPRAY INTO EACH NOSTRIL EVERY DAY    Fluticasone-Salmeterol (Wixela Inhub) 250-50 mcg/dose inhaler 1 puff, Inhalation, 2 times daily PRN    mometasone (ELOCON) 0.1 % lotion Topical, Daily    predniSONE 20 mg tablet 3 tablets once daily day 1 through 3 then 2 tablets daily day 4 through 6 then 1 tablet daily day 7 through 9.    Spacer/Aero-Holding Chambers (Vortex Valved Holding Chamber) WAQAS Does not apply, 2 times daily          Physical Exam    /82   Pulse 91   Ht 5' 8\" (1.727 m)   Wt 103 kg (227 lb)   SpO2 99%   BMI 34.52 kg/m²     GEN: AAO, No apparent distress.  Patient is well developed.  HEENT:  Pupils are equal, round and reactive.  Sclera are clear.  Fundoscopic exam is normal.  External ears are without lesions.  Oral pharynx is clear of ulcers or other lesions.  MMM.   NECK:  Supple.  There is no adenopathy appreciable in anterior " or posterior cervical chains or supraclavicularly.  JVP is normal.    HEART: Regular rate and rhythm.  There is no appreciable murmur, gallop or rub.  LUNGS: Clear to auscultation.  ABD:  Soft, without tenderness, rebound or guarding.  No appreciable organomegally.  NEURO: Speech and cognition are normal.  Strength is 5/5 throughout.  Tone is normal.  DTRs are 2/4 at the knees, ankles and elbows.  Gait is normal.  SKIN: There are no rashes or lesions    MUSCULOSKELETAL:   -Hands: normal  -Wrists: normal  -Elbows: normal  -Shoulders: normal  -Neck: normal  -Back: normal  -Hips: normal  -Knees: normal  -Ankles: normal  -Feet: normal      ________________________________________________________________________          Results Review    Component      Latest Ref [facility] 3/29/2023   WBC      4.31 - 10.16 Thousand/uL 8.61    RBC      3.81 - 5.12 Million/uL 5.57 (H)    Hemoglobin      11.5 - 15.4 g/dL 10.3 (L)    Hematocrit      34.8 - 46.1 % 34.7 (L)    MCV      82 - 98 fL 62 (L)    MCH      26.8 - 34.3 pg 18.5 (L)    MCHC      31.4 - 37.4 g/dL 29.7 (L)    RDW      11.6 - 15.1 % 17.8 (H)    MPV      8.9 - 12.7 fL 10.8    Platelet Count      149 - 390 Thousands/uL 312    nRBC      /100 WBCs 0    Neutrophils %      43 - 75 % 62    Immature Grans %      0 - 2 % 0    Lymphocytes %      14 - 44 % 30    Monocytes %      4 - 12 % 6    Eosinophils %      0 - 6 % 1    Basophils %      0 - 1 % 1    Absolute Neutrophils      1.85 - 7.62 Thousands/µL 5.39    Absolute Immature Grans      0.00 - 0.20 Thousand/uL 0.03    Absolute Lymphocytes      0.60 - 4.47 Thousands/µL 2.55    Absolute Monocytes      0.17 - 1.22 Thousand/µL 0.50    Absolute Eosinophils      0.00 - 0.61 Thousand/µL 0.10    Absolute Basophils      0.00 - 0.10 Thousands/µL 0.04    Iron Saturation      15 - 50 % 29    TIBC      250 - 450 ug/dL 309    Iron      50 - 170 ug/dL 90    TSH 3RD GENERATON      0.450 - 4.500 uIU/mL 3.660    FERRITIN      8 - 388 ng/mL 126        Legend:  (H) High  (L) Low      Impressions       Wheat allergy  ?celiac disease  Thalassemia  Chronic arthralgias    Plans:    Elena Augustine is a 34 y/o female with h/o symptoms since her 20s  With diarrhea, itchy rash, fatigue, arthralgias related to wheat products  Clinically suggestive celiac  Check serologies and hand films  (Celiac testing may not reflect as she is on gluten free diet)  R/o inflammatory arthritis  Recommend gluten free diet  OTC NSAIDs/Tylenol as needed    Will review test results and f/u as needed          Thank you for involving me in this patient's care.        Guillermo Bobo MD  [facility]

## 2024-03-20 LAB
HBV CORE AB SER QL: NORMAL
HBV CORE IGM SER QL: NORMAL
HBV SURFACE AG SER QL: NORMAL
HCV AB SER QL: NORMAL
RHEUMATOID FACT SER QL LA: NEGATIVE

## 2024-03-21 LAB — TTG IGA SER-ACNC: <2 U/ML (ref 0–3)

## 2024-03-22 LAB — CCP AB SER IA-ACNC: 1

## 2024-03-26 ENCOUNTER — TELEPHONE (OUTPATIENT)
Age: 34
End: 2024-03-26

## 2024-03-26 NOTE — TELEPHONE ENCOUNTER
Patient called asking if we could call in a script for nuvaring to the [facility] on [address].  Please call or send her a message in my chart when it is done.

## 2024-03-27 ENCOUNTER — TELEPHONE (OUTPATIENT)
Dept: OBGYN CLINIC | Facility: MEDICAL CENTER | Age: 34
End: 2024-03-27

## 2024-03-27 NOTE — TELEPHONE ENCOUNTER
Patient is due for yearly exam. Please schedule her and then we can send in a mediation refill to cover her until her appointment.

## 2024-04-25 NOTE — PROGRESS NOTES
"Subjective      Elena Augustine is a 33 y.o. female who presents for annual well woman exam.  Last Pap smear 3/29/23 NILM/ HR HPV(-).  Periods are regular every 3 months, lasting 5 days. No intermenstrual bleeding, spotting, or discharge.    Current contraception: NuvaRing vaginal inserts  History of abnormal Pap smear: no  Family history of uterine or ovarian cancer: no  Regular self breast exam: no  History of abnormal mammogram: mammograms to begin at age 41 unless otherwise clinically indicated  Family history of breast cancer: yes -mother around age 52; BRCA +  History of abnormal lipids: no    Menstrual History:  OB History          3    Para   2    Term   2            AB   1    Living   2         SAB   1    IAB        Ectopic        Multiple        Live Births   2           Obstetric Comments   Menarche at age 12.5              Menarche age: 12.5  No LMP recorded (lmp unknown).  Period Cycle (Days):  (every 3 months)  Period Duration (Days): 5  Period Pattern: Regular  Menstrual Flow: Light, Moderate, Heavy (heavy 2-3 days)  Menstrual Control:  (cup)  Menstrual Control Change Freq (Hours): cup- 2- 3 times  Dysmenorrhea: (!) Mild  Dysmenorrhea Symptoms: Cramping    The following portions of the patient's history were reviewed and updated as appropriate: allergies, current medications, past family history, past medical history, past social history, past surgical history, and problem list.    Review of Systems  Pertinent items are noted in HPI.      Objective      /68   Ht 5' 8\" (1.727 m)   Wt 104 kg (229 lb)   LMP  (LMP Unknown)   BMI 34.82 kg/m²     General:   alert and oriented, in no acute distress, alert, moderately obese, appears stated age, and cooperative   Heart: regular rate and rhythm, S1, S2 normal, no murmur, click, rub or gallop   Lungs: clear to auscultation bilaterally   Abdomen: soft, non-tender, without masses or organomegaly   Vulva: normal, Bartholin's, Urethra, " Grand Saline's normal   Vagina: normal mucosa, normal discharge, no palpable nodules   Cervix: no cervical motion tenderness and no lesions   Uterus: normal size, non-tender, normal shape and consistency   Adnexa: normal adnexa and no mass, fullness, tenderness   Breast: breasts appear normal, no suspicious masses, no skin or nipple changes or axillary nodes.              Assessment      @well woman  no contraindication to continue hormonal therapy@ .     Plan      All questions answered.  Breast self exam technique reviewed and patient encouraged to perform self-exam monthly.  Contraception: NuvaRing vaginal inserts.  Diagnosis explained in detail, including differential.  Dietary diary.  Discussed healthy lifestyle modifications.  Educational material distributed.  Follow up in 1 year.  Follow up as needed.  Breast awareness reviewed   Mother with history of breast cancer/positive BCRA testing and mother and siblings.  Referral placed for genetic counseling.  Patient has not been tested.  Encouraged healthy diet, exercise and lifestyle  Encouraged follow-up with PCP as needed  Written information provided about Gardasil vaccine  Will call/ Akippa message with results  VBI-    BMI Counseling: Body mass index is 34.82 kg/m². The BMI is above normal. Nutrition recommendations include 3-5 servings of fruits/vegetables daily. Exercise recommendations include exercising 3-5 times per week.

## 2024-04-26 ENCOUNTER — ANNUAL EXAM (OUTPATIENT)
Dept: OBGYN CLINIC | Facility: MEDICAL CENTER | Age: 34
End: 2024-04-26
Payer: COMMERCIAL

## 2024-04-26 VITALS
DIASTOLIC BLOOD PRESSURE: 68 MMHG | SYSTOLIC BLOOD PRESSURE: 102 MMHG | WEIGHT: 229 LBS | HEIGHT: 68 IN | BODY MASS INDEX: 34.71 KG/M2

## 2024-04-26 DIAGNOSIS — Z01.419 WELL WOMAN EXAM WITH ROUTINE GYNECOLOGICAL EXAM: Primary | ICD-10-CM

## 2024-04-26 DIAGNOSIS — N92.0 MENORRHAGIA WITH REGULAR CYCLE: ICD-10-CM

## 2024-04-26 DIAGNOSIS — Z80.3 FAMILY HISTORY OF BREAST CANCER IN MOTHER: ICD-10-CM

## 2024-04-26 DIAGNOSIS — Z30.44 ENCOUNTER FOR SURVEILLANCE OF VAGINAL RING HORMONAL CONTRACEPTIVE DEVICE: ICD-10-CM

## 2024-04-26 PROCEDURE — 99395 PREV VISIT EST AGE 18-39: CPT | Performed by: NURSE PRACTITIONER

## 2024-04-26 RX ORDER — ETONOGESTREL AND ETHINYL ESTRADIOL VAGINAL RING .015; .12 MG/D; MG/D
RING VAGINAL
Qty: 3 EACH | Refills: 3 | Status: SHIPPED | OUTPATIENT
Start: 2024-04-26

## 2024-04-29 ENCOUNTER — TELEPHONE (OUTPATIENT)
Dept: HEMATOLOGY ONCOLOGY | Facility: CLINIC | Age: 34
End: 2024-04-29

## 2024-04-29 NOTE — TELEPHONE ENCOUNTER
I spoke with Elena to schedule their consultation with [facility].     Scheduling Outcome: Patient is scheduled for an appointment on 1/8/2025 at 10am with Nabor magana    Personal/Family History Related to Appointment:     Personal History of Cancer: Patient reports no personal history of cancer    Family History of Cancer: Patient reports family history of mother had breast cancer, paternal grandfather had leukemia    Is patient of Ashkenazi [place] Heritage?: No    History of Genetic Testing:  Personal History of Genetic Testing: Patient report no personal history of Genetic Testing.    Family History of Genetic Testing: Patient reports that member(s) of their family have had Genetic Testing. Details: all siblings tested - 2 are positive for BRACA    Patient's preferred e-mail address: [email or web address]

## 2024-04-29 NOTE — TELEPHONE ENCOUNTER
I called Elena in response to a referral that was received for patient to establish care with [facility].     Outreach was made to schedule a consultation.    I left a voicemail explaining the reason for my call and advised patient to call [facility] at [phone].  The referral has been closed.

## 2024-05-17 ENCOUNTER — OFFICE VISIT (OUTPATIENT)
Dept: FAMILY MEDICINE CLINIC | Facility: CLINIC | Age: 34
End: 2024-05-17
Payer: COMMERCIAL

## 2024-05-17 VITALS
WEIGHT: 231.4 LBS | OXYGEN SATURATION: 99 % | BODY MASS INDEX: 35.07 KG/M2 | SYSTOLIC BLOOD PRESSURE: 122 MMHG | HEART RATE: 79 BPM | HEIGHT: 68 IN | TEMPERATURE: 97.4 F | RESPIRATION RATE: 20 BRPM | DIASTOLIC BLOOD PRESSURE: 68 MMHG

## 2024-05-17 DIAGNOSIS — M72.2 PLANTAR FASCIITIS OF LEFT FOOT: Primary | ICD-10-CM

## 2024-05-17 PROCEDURE — 99213 OFFICE O/P EST LOW 20 MIN: CPT | Performed by: FAMILY MEDICINE

## 2024-05-17 NOTE — ASSESSMENT & PLAN NOTE
Advised night splint and home exercise, if not improving consider imaging and podiatry or PT referal

## 2024-05-17 NOTE — PROGRESS NOTES
Assessment/Plan:       Problem List Items Addressed This Visit          Musculoskeletal and Integument    Plantar fasciitis of left foot - Primary     Advised night splint and home exercise, if not improving consider imaging and podiatry or PT referal              Subjective:      Patient ID: Elena Augustine is a 33 y.o. female.    HPI    33 year old presenting for heel pain on left side.    Worse after sitting for long periods worse.    Feels better as walling.    Wearing shoes with arch support.    The following portions of the patient's history were reviewed and updated as appropriate: allergies, current medications, past family history, past medical history, past social history, past surgical history and problem list.      Current Outpatient Medications:     albuterol (PROVENTIL HFA,VENTOLIN HFA) 90 mcg/act inhaler, Inhale 2 puffs every 4 (four) hours as needed for wheezing, Disp: 19 g, Rfl: 0    Auvi-Q 0.3 MG/0.3ML SOAJ, Inject 0.3 mL (0.3 mg total) into a muscle once for 1 dose (Patient not taking: Reported on 1/29/2024), Disp: 6 each, Rfl: 0    diphenhydrAMINE HCl (BENADRYL ALLERGY PO), Take by mouth as needed, Disp: , Rfl:     etonogestrel-ethinyl estradiol (NuvaRing) 0.12-0.015 MG/24HR vaginal ring, Insert vaginally and leave in place for 3 consecutive weeks, then remove for 1 week., Disp: 3 each, Rfl: 3    fluticasone (FLONASE) 50 mcg/act nasal spray, SPRAY 1 SPRAY INTO EACH NOSTRIL EVERY DAY, Disp: 48 mL, Rfl: 3     Review of Systems   Constitutional:  Negative for activity change and appetite change.   Respiratory:  Negative for apnea and chest tightness.    Cardiovascular: Negative.  Negative for chest pain and palpitations.   Gastrointestinal:  Negative for abdominal pain.   Musculoskeletal:  Negative for arthralgias and back pain.         Objective:      /68 (BP Location: Left arm, Patient Position: Sitting, Cuff Size: Large)   Pulse 79   Temp (!) 97.4 °F (36.3 °C) (Tympanic)   Resp 20    "Ht 5' 8\" (1.727 m)   Wt 105 kg (231 lb 6.4 oz)   LMP 04/15/2024 (Approximate)   SpO2 99%   BMI 35.18 kg/m²          Physical Exam  Constitutional:       Appearance: Normal appearance.   Cardiovascular:      Rate and Rhythm: Normal rate and regular rhythm.      Pulses: Normal pulses.      Heart sounds: Normal heart sounds.   Pulmonary:      Effort: Pulmonary effort is normal.   Abdominal:      General: Abdomen is flat.   Musculoskeletal:      Comments: Pain with dorsiflexion    Neurological:      General: No focal deficit present.      Mental Status: She is alert and oriented to person, place, and time.           Adam Huston MD  "

## 2024-07-19 DIAGNOSIS — Z00.6 ENCOUNTER FOR EXAMINATION FOR NORMAL COMPARISON OR CONTROL IN CLINICAL RESEARCH PROGRAM: ICD-10-CM

## 2024-07-23 ENCOUNTER — APPOINTMENT (OUTPATIENT)
Dept: LAB | Facility: MEDICAL CENTER | Age: 34
End: 2024-07-23

## 2024-07-23 DIAGNOSIS — Z00.6 ENCOUNTER FOR EXAMINATION FOR NORMAL COMPARISON OR CONTROL IN CLINICAL RESEARCH PROGRAM: ICD-10-CM

## 2024-07-23 PROCEDURE — 36415 COLL VENOUS BLD VENIPUNCTURE: CPT

## 2024-08-26 ENCOUNTER — OFFICE VISIT (OUTPATIENT)
Dept: BARIATRICS | Facility: CLINIC | Age: 34
End: 2024-08-26
Payer: COMMERCIAL

## 2024-08-26 VITALS
TEMPERATURE: 98.6 F | BODY MASS INDEX: 36.26 KG/M2 | WEIGHT: 231 LBS | RESPIRATION RATE: 16 BRPM | SYSTOLIC BLOOD PRESSURE: 105 MMHG | HEIGHT: 67 IN | DIASTOLIC BLOOD PRESSURE: 74 MMHG

## 2024-08-26 DIAGNOSIS — E66.09 CLASS 2 OBESITY DUE TO EXCESS CALORIES WITHOUT SERIOUS COMORBIDITY WITH BODY MASS INDEX (BMI) OF 36.0 TO 36.9 IN ADULT: Primary | ICD-10-CM

## 2024-08-26 DIAGNOSIS — Z86.32 HISTORY OF GESTATIONAL DIABETES: ICD-10-CM

## 2024-08-26 DIAGNOSIS — D56.3 BETA THALASSEMIA MINOR: ICD-10-CM

## 2024-08-26 DIAGNOSIS — D56.9 THALASSEMIA: ICD-10-CM

## 2024-08-26 PROBLEM — E66.812 CLASS 2 OBESITY DUE TO EXCESS CALORIES WITHOUT SERIOUS COMORBIDITY WITH BODY MASS INDEX (BMI) OF 36.0 TO 36.9 IN ADULT: Status: ACTIVE | Noted: 2023-03-28

## 2024-08-26 PROCEDURE — 99204 OFFICE O/P NEW MOD 45 MIN: CPT | Performed by: PHYSICIAN ASSISTANT

## 2024-08-26 RX ORDER — DIPHENOXYLATE HYDROCHLORIDE AND ATROPINE SULFATE 2.5; .025 MG/1; MG/1
1 TABLET ORAL DAILY
COMMUNITY

## 2024-08-26 NOTE — ASSESSMENT & PLAN NOTE
-Discussed options of HealthyCORE-Intensive Lifestyle Intervention Program, Very Low Calorie Diet-VLCD, and Conservative Program and the role of weight loss medications.Explained the importance of making lifestyle changes if utilizing medication to aid in weight loss. Not interested in medications at this time  -Initial weight loss goal of 5-10% weight loss for improved health  -Screening labs and records reviewed from prior. Recommend checking glucose, A1c, insulin, tsh, lipids. Had GDM prior.   - STOP BANG-3/8    -Patient is interested in pursuing conservative plan and bodystats    Initial Weight:231  Goal Weight:180

## 2024-08-26 NOTE — PROGRESS NOTES
Assessment/Plan:    Class 2 obesity due to excess calories without serious comorbidity with body mass index (BMI) of 36.0 to 36.9 in adult  -Discussed options of HealthyCORE-Intensive Lifestyle Intervention Program, Very Low Calorie Diet-VLCD, and Conservative Program and the role of weight loss medications.Explained the importance of making lifestyle changes if utilizing medication to aid in weight loss. Not interested in medications at this time  -Initial weight loss goal of 5-10% weight loss for improved health  -Screening labs and records reviewed from prior. Recommend checking glucose, A1c, insulin, tsh, lipids. Had GDM prior.   - STOP BANG-3/8    -Patient is interested in pursuing conservative plan and bodystats    Initial Weight:231  Goal Weight:180        Beta thalassemia minor  Recheck CBC    History of gestational diabetes  Recommend checking for insulin resistance as this maybe making weight loss more difficult      Return in about 6 months (around 2/26/2025) and for body stats.      Diagnoses and all orders for this visit:    Class 2 obesity due to excess calories without serious comorbidity with body mass index (BMI) of 36.0 to 36.9 in adult  -     Lipid panel; Future  -     Insulin, fasting; Future  -     Hemoglobin A1C; Future  -     Comprehensive metabolic panel; Future  -     CBC and differential; Future  -     TSH, 3rd generation with Free T4 reflex; Future    History of gestational diabetes  -     Lipid panel; Future  -     Insulin, fasting; Future  -     Hemoglobin A1C; Future  -     Comprehensive metabolic panel; Future  -     CBC and differential; Future  -     TSH, 3rd generation with Free T4 reflex; Future    Thalassemia  -     CBC and differential; Future  -     Iron Panel (Includes Ferritin, Iron Sat%, Iron, and TIBC); Future    Beta thalassemia minor    Other orders  -     BIOTIN MAXIMUM PO; Take by mouth  -     multivitamin (THERAGRAN) TABS; Take 1 tablet by mouth daily Flinstones with  iron          Subjective:   Chief Complaint   Patient presents with    Consult     MWM- Consult; GW 180lb; Waist 43.5in- Stop Bang 3/8.       Patient ID: Elena Augustine  is a 34 y.o. female with excess weight/obesity here to pursue weight management.    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 Then gained more weight affter colege. She lost 80 lb after .  She got up to 180 and was comfotable.  She got pregnant and also gained weight with COVID.  She lost 20-20 lb but is starting to gain some back.     Following gluten free diet. Noticed that forgetting to eat causes her to gain weight. Not a breakfast eater usually but doing so now. Tries to make sure she is eaitn regularly    Has a tendency for hypglycemic    Diet Recall:  Yogurt siggi w/chocolate chips  11AM: leftovers or salad-springmix, beet, goat cheese , chicken, stead or salmon.  Blueberry, parm cheese w/protein.  Uses balsalmic vinegar or gluten free bagel if really hungery. Or eggs/delacruz  D (biggest meal usually ): gluten free pasta once a week, chicken w/ brocoli 1 week. Winter chicken and orzo soup.      S: not much snack . Sometimes terra chips, yogurt or simply puffed       Obesity/Excess Weight:  Severity:  class II  Modifiers: Diet and Exercise      Hydration:at least 64 oz water , coffee w/milk . Rare unsweetened Iced tea  Alcohol: less than weekly  Exercise:yoga, cardio -HIIT 30 minutes . 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  She   Patient Active Problem List    Diagnosis Date Noted    Beta thalassemia minor 08/26/2024    History of gestational diabetes 08/26/2024    Plantar fasciitis of left foot 05/17/2024    Family history of breast cancer in mother 04/26/2024    Mild intermittent asthma without complication 01/29/2024    Menorrhagia with regular cycle 03/29/2023    Scalp lesion 03/29/2023    Class 2 obesity due to excess calories without serious comorbidity with body mass index (BMI) of 36.0 to 36.9 in adult 03/28/2023    Anxiety 06/10/2020     She  has a past surgical history that includes Mole removal.  Her family history includes Allergies in her brother, sister, and son; Allergy (severe) in her mother; Anxiety disorder in her sister; Asthma in her brother and son; Autism in her brother; Bipolar disorder in her brother and father; Breast cancer in her mother; Depression in her father; Diabetes in her brother and mother; Heart disease in her paternal grandmother; No Known Problems in her son; Other in her brother and sister; Polycystic ovary syndrome in her sister; Rashes / Skin problems in her mother; Stroke in her maternal grandmother.  She  reports that she has never smoked. She has never used smokeless tobacco. She reports current alcohol use. She reports that she does not use drugs.  Current Outpatient Medications   Medication Sig Dispense Refill    Airsupra 90-80 MCG/ACT AERO Inhale 2 puffs if needed (coughing, wheezing) 10.7 g 3    BIOTIN MAXIMUM PO Take by mouth      diphenhydrAMINE HCl (BENADRYL ALLERGY PO) Take by mouth if needed      multivitamin (THERAGRAN) TABS Take 1 tablet by mouth daily Flinstones with iron      Auvi-Q 0.3 MG/0.3ML SOAJ Inject 0.3 mL (0.3 mg total) into a muscle once for 1 dose (Patient not taking: Reported on 1/29/2024) 6 each 0    etonogestrel-ethinyl estradiol (NuvaRing) 0.12-0.015 MG/24HR vaginal ring Insert  "vaginally and leave in place for 3 consecutive weeks, then remove for 1 week. (Patient not taking: Reported on 8/1/2024) 3 each 3    fluticasone (FLONASE) 50 mcg/act nasal spray SPRAY 1 SPRAY INTO EACH NOSTRIL EVERY DAY (Patient not taking: Reported on 8/26/2024) 48 mL 3     No current facility-administered medications for this visit.     Current Outpatient Medications on File Prior to Visit   Medication Sig    Airsupra 90-80 MCG/ACT AERO Inhale 2 puffs if needed (coughing, wheezing)    BIOTIN MAXIMUM PO Take by mouth    diphenhydrAMINE HCl (BENADRYL ALLERGY PO) Take by mouth if needed    multivitamin (THERAGRAN) TABS Take 1 tablet by mouth daily Flinstones with iron    Auvi-Q 0.3 MG/0.3ML SOAJ Inject 0.3 mL (0.3 mg total) into a muscle once for 1 dose (Patient not taking: Reported on 1/29/2024)    etonogestrel-ethinyl estradiol (NuvaRing) 0.12-0.015 MG/24HR vaginal ring Insert vaginally and leave in place for 3 consecutive weeks, then remove for 1 week. (Patient not taking: Reported on 8/1/2024)    fluticasone (FLONASE) 50 mcg/act nasal spray SPRAY 1 SPRAY INTO EACH NOSTRIL EVERY DAY (Patient not taking: Reported on 8/26/2024)     No current facility-administered medications on file prior to visit.     She is allergic to other..    Review of Systems   Constitutional:  Negative for fever.   Respiratory:  Negative for shortness of breath.    Cardiovascular:  Negative for chest pain and palpitations.   Gastrointestinal:  Negative for abdominal pain, constipation, diarrhea and vomiting.   Genitourinary:  Negative for difficulty urinating.   Skin:  Negative for rash.   Neurological:  Negative for headaches.   Psychiatric/Behavioral:  Negative for dysphoric mood. The patient is not nervous/anxious.        Objective:    /74 (BP Location: Left arm, Patient Position: Sitting)   Temp 98.6 °F (37 °C) (Tympanic)   Resp 16   Ht 5' 7\" (1.702 m)   Wt 105 kg (231 lb)   BMI 36.18 kg/m²     Physical Exam  Vitals and " nursing note reviewed.   Constitutional:       General: She is not in acute distress.     Appearance: She is well-developed. She is obese.   HENT:      Head: Normocephalic and atraumatic.   Eyes:      Conjunctiva/sclera: Conjunctivae normal.   Neck:      Thyroid: No thyromegaly.   Pulmonary:      Effort: Pulmonary effort is normal. No respiratory distress.   Skin:     Findings: No rash (visible).   Neurological:      Mental Status: She is alert and oriented to person, place, and time.   Psychiatric:         Mood and Affect: Mood normal.         Behavior: Behavior normal.

## 2024-08-29 NOTE — PROGRESS NOTES
Weight Management Medical Nutrition Assessment  Elena presented for a meal planning visit and body composition test. Today's weight is 231#. Completed a body composition using SECA scale and reviewed results with patient. Hx thalessemia. Reports weight changes her entire life especially following pregnancy. She has monitored calorie intake before but this led to disordered eating. She lost 20# in 2023. Reports weight plateau this year. Per dietary recall patient has a tendency to forget meals. When this happens she saves calories for the evening. RD explained metabolism function and importance of meal consistency. She is open to tracking protein intake to determine if goal is reached. Will not be tracking calories at this time due to previous behaviors. Did not develop meal plan for this reason. Provided estimated needs. Will f/u in 3 months.     Patient seen by Medical Provider in past 6 months:  yes on 8/26/24  Requested to schedule appointment with Medical Provider: No      Anthropometric Measurements  Start Weight (#):  231# on 8/26/24  Current Weight (#): 231#  TBW % Change from start weight: n/a  Ideal Body Weight (#): 135#  Goal Weight (#): 180#  Highest: unsure of highest weight but reports weight changes when she went through stressful experiences. Ranged from 265-285#.  Lowest: 159#    Weight Loss History  Previous weight loss attempts: Self Created Diets (Portion Control, Healthy Food Choices, etc.)    Food and Nutrition Related History  Wake up: 7-8AM   Bed Time: 10PM-12AM    Food Recall  Breakfast: 7-8AM Siggi vanilla yogurt + chocolate chips    Snack: skip  Lunch: 11:30AM-12PM leftovers OR chips + cheese OR GF bagel sandwich OR salad springmix, beet, goat cheese , chicken, steak or salmon.  Blueberry, parm cheese w/protein.  Uses balsalmic vinegar   Snack: skip OR chips and goat cheese   Dinner: 6-6:30PM roasted chicken, broccoli, and rice OR lemon and GF orzo soup OR GF pasta + vegetables + lean  protein + sauce + cheese  Snack: skip    Beverages: 64oz water, 1 cup coffee with splash or chocolate or regular milk, unsweetened iced tea (rare)  Alcohol: less than weekly  Volume of beverage intake: at least 64oz    Weekends: Same, occasionally will miss lunch  Cravings: chocolate when she has menstrual cycle  Trouble area of day: mid-day. Sometimes skips lunch.    Frequency of Eating out: irregularly, 2-3 times per month  Food restrictions: following GF diet  Cooking: self   Food Shopping: self    Physical Activity Intake  Activity: Yoga, HIIT for 30 minutes  Frequency: 3-5 days per week  Physical limitations/barriers to exercise: n/a    Estimated Needs  Energy  SECA: BMR: 1698      X 1.5 -1000 = 1547  [place] [address] [employer] Needs: BMR : 1785   1-2# loss weekly sedentary:  1983-1170             1-2# loss weekly lightly active: 1966-0202  Maintenance calories for sedentary activity level: 2143  Protein: 74-92g      (1.2-1.5g/kg IBW)  Fluid: 72oz     (35mL/kg IBW)    Nutrition Diagnosis  Yes;    Overweight/obesity  related to Food and nutrition related knowledge deficit as evidenced by  BMI more than normative standard for age and sex (obesity-grade II 35-39.9)       Nutrition Intervention    Nutrition Prescription  Calories: 0957-8095 calories (flex up when active)  Protein: 95g  Fluid: 72oz    Meal Plan (Lan/Pro/Carb) We did not develop meal plan as not appropriate. RD provided kcal and protein goal.  Breakfast:  Snack:  Lunch:  Snack:  Dinner:  Snack:    Nutrition Education:   Calorie controlled menu  Lean protein food choices  Healthy snack options  Food journaling tips      Nutrition Counseling:  Strategies: meal planning, portion sizes, healthy snack choices, hydration, fiber intake, protein intake, exercise, food journal      Monitoring and Evaluation:  Evaluation criteria:  Energy Intake  Meet protein needs  Maintain adequate hydration  Monitor weekly weight  Meal planning/preparation  Food journal    Decreased portions at mealtimes and snacks  Physical activity     Barriers to learning:none  Readiness to change: Action:  (Changing behavior)  Comprehension: good  Expected Compliance: good

## 2024-09-03 ENCOUNTER — CLINICAL SUPPORT (OUTPATIENT)
Dept: BARIATRICS | Facility: CLINIC | Age: 34
End: 2024-09-03

## 2024-09-03 VITALS — BODY MASS INDEX: 36.26 KG/M2 | WEIGHT: 231 LBS | HEIGHT: 67 IN

## 2024-09-03 DIAGNOSIS — R63.5 ABNORMAL WEIGHT GAIN: Primary | ICD-10-CM

## 2024-09-03 PROCEDURE — WMDI30

## 2024-09-03 PROCEDURE — RECHECK

## 2024-09-03 PROCEDURE — WEIGHT

## 2024-09-05 ENCOUNTER — LAB (OUTPATIENT)
Dept: LAB | Facility: MEDICAL CENTER | Age: 34
End: 2024-09-05
Payer: COMMERCIAL

## 2024-09-05 DIAGNOSIS — D56.9 THALASSEMIA: ICD-10-CM

## 2024-09-05 DIAGNOSIS — E66.09 CLASS 2 OBESITY DUE TO EXCESS CALORIES WITHOUT SERIOUS COMORBIDITY WITH BODY MASS INDEX (BMI) OF 36.0 TO 36.9 IN ADULT: ICD-10-CM

## 2024-09-05 DIAGNOSIS — Z86.32 HISTORY OF GESTATIONAL DIABETES: ICD-10-CM

## 2024-09-05 LAB
ALBUMIN SERPL BCG-MCNC: 4.3 G/DL (ref 3.5–5)
ALP SERPL-CCNC: 75 U/L (ref 34–104)
ALT SERPL W P-5'-P-CCNC: 19 U/L (ref 7–52)
ANION GAP SERPL CALCULATED.3IONS-SCNC: 8 MMOL/L (ref 4–13)
AST SERPL W P-5'-P-CCNC: 12 U/L (ref 13–39)
BASOPHILS # BLD AUTO: 0.03 THOUSANDS/ÂΜL (ref 0–0.1)
BASOPHILS NFR BLD AUTO: 0 % (ref 0–1)
BILIRUB SERPL-MCNC: 0.58 MG/DL (ref 0.2–1)
BUN SERPL-MCNC: 17 MG/DL (ref 5–25)
CALCIUM SERPL-MCNC: 9.3 MG/DL (ref 8.4–10.2)
CHLORIDE SERPL-SCNC: 106 MMOL/L (ref 96–108)
CO2 SERPL-SCNC: 26 MMOL/L (ref 21–32)
CREAT SERPL-MCNC: 0.92 MG/DL (ref 0.6–1.3)
EOSINOPHIL # BLD AUTO: 0.11 THOUSAND/ÂΜL (ref 0–0.61)
EOSINOPHIL NFR BLD AUTO: 1 % (ref 0–6)
ERYTHROCYTE [DISTWIDTH] IN BLOOD BY AUTOMATED COUNT: 17.1 % (ref 11.6–15.1)
EST. AVERAGE GLUCOSE BLD GHB EST-MCNC: 114 MG/DL
FERRITIN SERPL-MCNC: 62 NG/ML (ref 11–307)
GFR SERPL CREATININE-BSD FRML MDRD: 82 ML/MIN/1.73SQ M
GLUCOSE SERPL-MCNC: 91 MG/DL (ref 65–140)
HBA1C MFR BLD: 5.6 %
HCT VFR BLD AUTO: 34.7 % (ref 34.8–46.1)
HGB BLD-MCNC: 10.5 G/DL (ref 11.5–15.4)
IMM GRANULOCYTES # BLD AUTO: 0.04 THOUSAND/UL (ref 0–0.2)
IMM GRANULOCYTES NFR BLD AUTO: 0 % (ref 0–2)
INSULIN SERPL-ACNC: 50.82 UIU/ML (ref 1.9–23)
IRON SATN MFR SERPL: 25 % (ref 15–50)
IRON SERPL-MCNC: 82 UG/DL (ref 50–212)
LYMPHOCYTES # BLD AUTO: 2.75 THOUSANDS/ÂΜL (ref 0.6–4.47)
LYMPHOCYTES NFR BLD AUTO: 26 % (ref 14–44)
MCH RBC QN AUTO: 19.1 PG (ref 26.8–34.3)
MCHC RBC AUTO-ENTMCNC: 30.3 G/DL (ref 31.4–37.4)
MCV RBC AUTO: 63 FL (ref 82–98)
MONOCYTES # BLD AUTO: 0.65 THOUSAND/ÂΜL (ref 0.17–1.22)
MONOCYTES NFR BLD AUTO: 6 % (ref 4–12)
NEUTROPHILS # BLD AUTO: 6.83 THOUSANDS/ÂΜL (ref 1.85–7.62)
NEUTS SEG NFR BLD AUTO: 67 % (ref 43–75)
NRBC BLD AUTO-RTO: 0 /100 WBCS
PLATELET # BLD AUTO: 342 THOUSANDS/UL (ref 149–390)
PMV BLD AUTO: 11.1 FL (ref 8.9–12.7)
POTASSIUM SERPL-SCNC: 3.8 MMOL/L (ref 3.5–5.3)
PROT SERPL-MCNC: 7 G/DL (ref 6.4–8.4)
RBC # BLD AUTO: 5.5 MILLION/UL (ref 3.81–5.12)
SODIUM SERPL-SCNC: 140 MMOL/L (ref 135–147)
TIBC SERPL-MCNC: 333 UG/DL (ref 250–450)
TSH SERPL DL<=0.05 MIU/L-ACNC: 2.85 UIU/ML (ref 0.45–4.5)
UIBC SERPL-MCNC: 251 UG/DL (ref 155–355)
WBC # BLD AUTO: 10.41 THOUSAND/UL (ref 4.31–10.16)

## 2024-09-05 PROCEDURE — 83550 IRON BINDING TEST: CPT

## 2024-09-05 PROCEDURE — 83036 HEMOGLOBIN GLYCOSYLATED A1C: CPT

## 2024-09-05 PROCEDURE — 84443 ASSAY THYROID STIM HORMONE: CPT

## 2024-09-05 PROCEDURE — 83540 ASSAY OF IRON: CPT

## 2024-09-05 PROCEDURE — 80053 COMPREHEN METABOLIC PANEL: CPT

## 2024-09-05 PROCEDURE — 82728 ASSAY OF FERRITIN: CPT

## 2024-09-05 PROCEDURE — 36415 COLL VENOUS BLD VENIPUNCTURE: CPT

## 2024-09-05 PROCEDURE — 83525 ASSAY OF INSULIN: CPT

## 2024-09-05 PROCEDURE — 85025 COMPLETE CBC W/AUTO DIFF WBC: CPT

## 2024-09-18 ENCOUNTER — TELEPHONE (OUTPATIENT)
Dept: BARIATRICS | Facility: CLINIC | Age: 34
End: 2024-09-18

## 2024-09-18 NOTE — TELEPHONE ENCOUNTER
Spoke with pt and read the note , and she had no further questions and and will follow up with PCP.

## 2024-09-19 LAB
APOB+LDLR+PCSK9 GENE MUT ANL BLD/T: NOT DETECTED
BRCA1+BRCA2 DEL+DUP + FULL MUT ANL BLD/T: ABNORMAL
GENE DIS ANL INTERP-IMP: POSITIVE
MLH1+MSH2+MSH6+PMS2 GN DEL+DUP+FUL M: NOT DETECTED

## 2024-09-20 ENCOUNTER — TELEPHONE (OUTPATIENT)
Dept: OTHER | Facility: HOSPITAL | Age: 34
End: 2024-09-20

## 2024-09-25 ENCOUNTER — TELEPHONE (OUTPATIENT)
Dept: OTHER | Facility: HOSPITAL | Age: 34
End: 2024-09-25

## 2024-10-03 ENCOUNTER — TELEPHONE (OUTPATIENT)
Dept: OTHER | Facility: HOSPITAL | Age: 34
End: 2024-10-03

## 2024-10-03 DIAGNOSIS — R89.8 ABNORMAL GENETIC TEST: Primary | ICD-10-CM

## 2024-10-03 NOTE — TELEPHONE ENCOUNTER
Elena recently participated in the [facility] Answers study and has an actionable result related to HBOC. She would like a referral to a genetic counselor.

## 2024-10-04 ENCOUNTER — PATIENT MESSAGE (OUTPATIENT)
Dept: GENETICS | Facility: CLINIC | Age: 34
End: 2024-10-04

## 2024-10-07 ENCOUNTER — TELEPHONE (OUTPATIENT)
Age: 34
End: 2024-10-07

## 2024-10-07 ENCOUNTER — TELEPHONE (OUTPATIENT)
Dept: GENETICS | Facility: CLINIC | Age: 34
End: 2024-10-07

## 2024-10-07 DIAGNOSIS — Z15.09 BRCA1 GENE MUTATION POSITIVE: Primary | ICD-10-CM

## 2024-10-07 DIAGNOSIS — Z15.01 BRCA1 GENE MUTATION POSITIVE: Primary | ICD-10-CM

## 2024-10-07 NOTE — TELEPHONE ENCOUNTER
Patient returning call from Genetics Cyn Tomlin    HIPAA verified.    Returning call to discuss results     Please call [phone]

## 2024-10-07 NOTE — TELEPHONE ENCOUNTER
Post-Test Genetic Counseling Consult Note    Patient Name: Elena Augustine   /Age: 1990/34 y.o.    Date of Service: 10/7/2024  Genetic Counselor: Cyn Tomlin MS, [facility] [place]  Interpretation Services: None  Location: Telephone consult   Length of Visit: 20 Minutes    Elena presents today for a consult regarding her DNA Answers test results.    Genetic Testing History:     Report Date: 10/3/2024     Test: Helix Molecular Screen (11 genes): BRCA1, BRCA2, MLH1, MSH2, MSH6, PMS2, EPCAM, APOB, LDLR, LDLRAP1, and PCSK9       Result: Positive:     BRCA1 c.5266dup([id number]); Heterozygous; Pathogenic     Relevant Family History   Patient report she is unsure of Ashkenazi Christian ancestry.     Maternal Family History:  Mother: breast cancer diagnosed at 51, s/p bilateral mastectomy; PAYTON secondary to bleeding; BRCA1+ (currently 55 y/o)   Great-grandmother (grandmother's side): possible stomach cancer, no info ()    Paternal Family History:  Grandfather: leukemia (d. 80s)     Please refer to the scanned pedigree in the Media Tab for a complete family history     *All history is reported as provided by the patient; records are not available for review, except where indicated.     DNA Answers Result:  Elena underwent genetic testing through the [facility] health research program. As a part of this program, 11 genes associated with hereditary breast and ovarian cancer (HBOC) syndrome, Torrez syndrome and familial hypercholesterolemia (FH) were tested. Elena's result returned positive for a pathogenic variant in the BRCA1 gene.     Elena's mother was diagnosed with breast cancer ~5 years ago and reportedly tested positive for a BRCA1 pathogenic variant as well. Elena was aware that BRCA1 mutation was running in her family and had a genetic counseling appointment scheduled for 2025.     During today's visit, Elena and I reviewed the cancer risks associated with her  result, and the recommendations for screening and management.    Assessment:  Elena carries one pathogenic variant in the BRCA1 gene, specifically c.5266dup([id number]). The BRCA1 gene is associated with autosomal dominant hereditary breast and ovarian cancer (HBOC) syndrome (MedGen UID: [id number]).     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      Hereditary breast and ovarian cancer (HBOC) syndrome  Women with a single BRCA1 pathogenic variant have approximately a 60-72% lifetime risk of breast cancer. The risk for a second primary breast cancer within 20 years of the first diagnosis is between 30-40%. In premenopausal women, the risk for a second breast cancer within 15 years of the first diagnosis is >20%.    Women also have up to a 16-59% lifetime risk for ovarian, fallopian tube, or peritoneal cancer to age 70.      Men with a BRCA1 pathogenic variant have up to a 0.2-1.2% risk for breast cancer and up to a 7-26% risk for prostate cancer.      Men and women also have an elevated risk for melanoma and up to a <=5% risk for pancreatic cancer.     Reproductive Risks   If an individual and their partner both carry a pathogenic variant in one copy of the BRCA1 gene, there is a 25% chance that a child could inherit a pathogenic variant in both copies of the BRCA1 gene. Pathogenic variants in both copies of the BRCA1 gene cause a condition called Fanconi anemia (type S).     Fanconi anemia is characterized by short stature, microcephaly, developmental delay, abnormal skin pigmentation, scoliosis, abnormally formed bones, and frequent infections due to a weakened immune system. There is also significant impact to the bone marrow's ability to form blood cells including platelets, leading to fatigue, easy bruising, bleeding and an increased risk for acute myeloid leukemia.     Individuals of reproductive age with BRCA1 pathogenic variants may consider testing  reproductive partners for reproductive purposes. Carrier couples can consult a prenatal genetic counselor to discuss their reproductive options.     Risks and Testing for Family Members:  This test result may help clarify the risk for other family members to develop cancer. There is a 50% chance all first-degree relatives (parents, siblings and children) inherited the BRCA1 pathogenic variant. Elena mentioned that all three siblings have already pursued testing already. Testing is not recommended for children under the age of 18, as there are no childhood cancer risks known to be associated with a single pathogenic variant in this gene.    Other relatives such as aunts, uncles and cousins may also be at risk. Since this variant was reportedly inherited from lEena's maternal side, we recommend that she share this result with maternal relatives who have not pursued testing yet.     If Elena's have any questions or are interested in testing they can reach out to the main Genetics number at [phone] for additional information.     Managment:  Management guidelines for individuals with pathogenic and likely pathogenic BRCA1 variants have been developed by the National Comprehensive Cancer Network  (https://www.nccn.org/guidelines/category_2).  The recommendations listed below are specific to Elena and are are based on recommendations in the the NCCN guidelines as of 10/7/2024.  These recommendations are subject to change over time and the newest guidelines should be referenced for the most up to date recommendations.       Plan:  WOMEN  Breast Cancer Screening  -Breast awareness starting at age 18 y  -Clinical breast exam every 6-12 mo, starting at age 25 y     -Age 25-29 y, annual breast MRI screening with and without contrast (or mammogram only if MRI is unavailable) or individualized based on family history if a breast cancer diagnosis before 30 is present.    -Age 30-75y, annual mammogram and breast MRI  screening with contrast    -Age ?75 y, management should be considered on an individual basis.    -Discuss option of risk-reducing mastectomy    -For women with a BRCA pathogenic/likely pathogenic variant who are treated for breast cancer and have not had a bilateral mastectomy, screening with annual mammogram and breast MRI should continue as described above.    Gynecologic Cancer Screening  Ovarian Cancer  -Recommend risk-reducing salpingo-oophorectomy (RRSO), typically between 35 and 40y, and upon completion of child bearing. See Risk-Reducing Salpingo-Oophorectomy (RRSO) Protocol in NCCN Guidelines for Ovarian Cancer - Principles of Surgery.    -Salpingectomy alone is not the standard of care for risk reduction, although clinical trials of interval salpingectomy and delayed oophorectomy are ongoing. The concern for risk-reducing salpingectomy alone is that women are still at risk for developing ovarian cancer. In addition, in premenopausal women, oophorectomy likely reduces the risk of developing breast cancer but the magnitude is uncertain and may be gene-specific.    Elena asked about possible residual risk for ovarian cancer following a BSO.  A small residual risk of primary peritoneal cancer after RRSO remains (3%-4%), especially in BRCA1 mutation carriers (PMID: [id number], [id number]).    Uterine Cancer   -Limited date suggest that there may be a slightly increased risk of serous uterine cancer among women with a BRCA1 pathogenic/likely pathogenic variant. The clinical significance of these findings is unclear. Further evaluation of the risk of serous uterine cancer in the BRCA population needs to be undertaken. The provider and patient should discuss the risks and benefits of concurrent hysterectomy at the time of RRSO for women with a BRCA1 pathogenic/likely pathogenic variant prior to surgery.  Women who undergo hysterectomy at the time of RRSO are candidates for estrogen alone hormone replacement therapy  which is associated with a decreased risk of breast cancer compared to combined estrogen and progesterone which is required when the uterus is left in situ.       Skin Cancer Screening  -No specific screening guidelines exist for melanoma, but general melanoma risk management is appropriate, such as annual full-body skin examination and minimizing UV exposure.    Pancreatic Cancer Screening:  Current NCCN Guidelines recommend pancreatic cancer screening for individuals with an BRCA1 pathogenic variant and a first- or second-degree relative with exocrine pancreatic cancer from the same side of the family as the identified pathogenic/likely pathogenic germline variant. Pancreatic cancer screening typically begins at age 50 (or 10 years younger than the earliest exocrine pancreatic cancer diagnosis in the family, whichever is earlier) and includes contrast-enhanced MRI/MRCP (magnetic resonance cholangiopancreatography) and/or endoscopic ultrasound (EU).      Currently there is no known family history of pancreatic cancer therefore we do not recommend pancreatic screening for Elena at this time. We did encourage Elena to keep her healthcare providers updated on any changes to her personal or family history as updated information may change this recommendation.    Other Screening:  There are no additional recommendations based on Elena's result. She should continue cancer screening and medical management as clinically indicated and as determined appropriate by her healthcare providers.    Additional Testing:  Elena and I reviewed that the testing performed in this study assessed for 7 cancer risk-related genes. Additional cancer risk genes are available for diagnostic testing. Based on Elena's reported family history,  she does not meet additional criteria.  We reviewed that [employer] offers a self-pay options of $250. She will discuss the option for additional testing with her  and contact the  genetics program if she has interest in the future.     Additional Information:  A healthy lifestyle will improve overall health and reduce risk for illness. Eating a healthy diet and exercising for 4 hours per week is recommended. Both diet and exercise have been shown to help maintain a healthy weight. Postmenopausal women who are overweight are at higher risk for breast cancer. Moderate to heavy alcohol use can increase the risk for some cancers. Smoking cigarettes can also increase risk for breast, lung, prostate, pancreatic and other cancers.      Positive Result: Elena was strongly encouraged to follow up on with our office on an annual basis to review the most up to date guidelines as recommendations are subject to change over time.

## 2024-10-08 ENCOUNTER — TELEPHONE (OUTPATIENT)
Dept: HEMATOLOGY ONCOLOGY | Facility: CLINIC | Age: 34
End: 2024-10-08

## 2024-10-08 NOTE — TELEPHONE ENCOUNTER
Referral to Surgical Oncology received.  Chart reviewed by  for Surgical oncology at this time.       Diagnosis:   Diagnosis   Z15.01, Z15.09 (ICD-10-CM) - BRCA1 gene mutation positive     After review of chart, instructions for scheduling added to referral and sent to be scheduled as advised.

## 2024-11-22 ENCOUNTER — DOCUMENTATION (OUTPATIENT)
Dept: HEMATOLOGY ONCOLOGY | Facility: CLINIC | Age: 34
End: 2024-11-22

## 2024-11-22 ENCOUNTER — OFFICE VISIT (OUTPATIENT)
Dept: SURGICAL ONCOLOGY | Facility: CLINIC | Age: 34
End: 2024-11-22
Payer: COMMERCIAL

## 2024-11-22 VITALS
DIASTOLIC BLOOD PRESSURE: 90 MMHG | HEIGHT: 67 IN | SYSTOLIC BLOOD PRESSURE: 128 MMHG | WEIGHT: 235 LBS | OXYGEN SATURATION: 99 % | BODY MASS INDEX: 36.88 KG/M2 | HEART RATE: 91 BPM

## 2024-11-22 DIAGNOSIS — Z15.09 BRCA1 GENE MUTATION POSITIVE: Primary | ICD-10-CM

## 2024-11-22 DIAGNOSIS — Z15.01 BRCA1 GENE MUTATION POSITIVE: Primary | ICD-10-CM

## 2024-11-22 DIAGNOSIS — Z80.3 FAMILY HISTORY OF BREAST CANCER IN MOTHER: ICD-10-CM

## 2024-11-22 PROCEDURE — 99205 OFFICE O/P NEW HI 60 MIN: CPT | Performed by: SURGERY

## 2024-11-22 NOTE — PROGRESS NOTES
Surgical Oncology Consult Note       [address] [facility]  [facility]  [address] [facility] [address]    Elena Fawn  1990  [id number]      Chief Complaint   Patient presents with    Consult     NP ref by genetics for BRCA1 + on helix testing        Assessment & Plan    1. BRCA1 gene mutation positive  -     Ambulatory referral to Surgical Oncology  2. Family history of breast cancer in mother       Oncology History    No history exists.        34-year-old female had helix testing and pathogenic mutation of BRCA1.  She has family history of breast cancer.  Her mom diagnosed with breast cancer and she has BRCA1 mutation she had double mastectomy.  She denies of any breast pain nipple discharge nipple retraction or skin changes.  Here to discuss further workup and management.  As of now she has not had any baseline studies.  She has 2 kids she would like to have another kid.          Review of Systems   Constitutional:  Negative for chills and fever.   HENT:  Negative for ear pain and sore throat.    Eyes:  Negative for pain and visual disturbance.   Respiratory:  Negative for cough and shortness of breath.    Cardiovascular:  Negative for chest pain and palpitations.   Gastrointestinal:  Negative for abdominal pain and vomiting.   Genitourinary:  Negative for dysuria and hematuria.   Musculoskeletal:  Negative for arthralgias and back pain.   Skin:  Negative for color change and rash.   Neurological:  Negative for seizures and syncope.   All other systems reviewed and are negative.       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)         Past Surgical History:   Procedure Laterality Date    MOLE REMOVAL          Family History   Problem Relation Age of Onset    Breast cancer Mother         BRCA +; 50s    Diabetes Mother     Rashes / Skin problems Mother         sores d/t scratching    Allergy (severe) Mother         food and env    Depression Father     Bipolar disorder Father     Polycystic ovary syndrome Sister     Anxiety disorder Sister     Other Sister         BRCA +    Allergies Sister         soy allergy    Diabetes Brother     Bipolar disorder Brother     Other Brother         BRCA +    Autism Brother     Allergies Brother         food and env.    Asthma Brother     No Known Problems Son     Asthma Son     Allergies Son     Stroke Maternal Grandmother     Heart disease Paternal Grandmother     Colon cancer Neg Hx     Ovarian cancer Neg Hx         Social History     Socioeconomic History    Marital status: /Civil Union     Spouse name: Not on file    Number of children: Not on file    Years of education: Not on file    Highest education level: Not on file   Occupational History    Not on file   Tobacco Use    Smoking status: Never    Smokeless tobacco: Never   Vaping Use    Vaping status: Never Used   Substance and Sexual Activity    Alcohol use: Yes     Comment: social    Drug use: Never    Sexual activity: Yes     Partners: Male     Birth control/protection: Condom Male, Ring   Other Topics Concern    Not on file   Social History Narrative    Do you have pets? dog and cat Is pet allowed in bedroom?Yes    Are you a smoker? Never    Does anyone smoke in your home? No       Do you live with smokers? No    [place] frequently? No   How many  times a year? N/A      Social Drivers of Health     Financial Resource Strain: Not on file   Food Insecurity: Not on file   Transportation Needs: Not on file   Physical Activity: Not on file   Stress: Not on file   Social Connections: Not on file   Intimate Partner Violence: Not on file   Housing Stability: Not on file        Current Outpatient Medications:     Airsupra 90-80 MCG/ACT AERO, Inhale 2 puffs if needed (coughing, wheezing), Disp: 10.7 g, Rfl: 3    BIOTIN MAXIMUM PO, Take by mouth, Disp: , Rfl:     diphenhydrAMINE HCl (BENADRYL ALLERGY PO), Take by mouth if needed, Disp: , Rfl:     multivitamin (THERAGRAN) TABS, Take 1 tablet by mouth daily Flinstones with iron, Disp: , Rfl:     Auvi-Q 0.3 MG/0.3ML SOAJ, Inject 0.3 mL (0.3 mg total) into a muscle once for 1 dose (Patient not taking: Reported on 1/29/2024), Disp: 6 each, Rfl: 0    etonogestrel-ethinyl estradiol (NuvaRing) 0.12-0.015 MG/24HR vaginal ring, Insert vaginally and leave in place for 3 consecutive weeks, then remove for 1 week. (Patient not taking: Reported on 8/1/2024), Disp: 3 each, Rfl: 3    fluticasone (FLONASE) 50 mcg/act nasal spray, SPRAY 1 SPRAY INTO EACH NOSTRIL EVERY DAY (Patient not taking: Reported on 8/26/2024), Disp: 48 mL, Rfl: 3     Allergies   Allergen Reactions    Other Swelling     Whole wheat/grain products - can have regular white flour/grains       Physical Exam:     Vitals:    11/22/24 1134   BP: 128/90   Pulse: 91   SpO2: 99%     Physical Exam  Constitutional:       Appearance: Normal appearance.   HENT:      Head: Normocephalic and atraumatic.      Nose: Nose normal.      Mouth/Throat:      Mouth: Mucous membranes are moist.   Eyes:      Pupils: Pupils are equal, round, and reactive to light.   Cardiovascular:      Rate and Rhythm: Normal rate.      Pulses: Normal pulses.      Heart sounds: Normal heart sounds.   Pulmonary:      Effort: Pulmonary effort is normal.      Breath sounds: Normal breath sounds.   Chest:       Comments: Bilateral breast examination no palpable mass masses nipple discharge nipple retraction or skin changes bilateral axillary and supraclavicular examination no palpable adenopathy.  Patient was examined seated as well as supine position.  Abdominal:      General: Bowel sounds are normal.      Palpations: Abdomen is soft.   Musculoskeletal:         General: Normal range of motion.      Cervical back: Normal range of motion and neck supple.   Skin:     General: Skin is warm.   Neurological:      General: No focal deficit present.      Mental Status: She is alert and oriented to person, place, and time.   Psychiatric:         Mood and Affect: Mood normal.         Behavior: Behavior normal.         Thought Content: Thought content normal.         Judgment: Judgment normal.         Results:   Healing test results were reviewed and discussed in detail.    Discussion/Summary:   Here we have very pleasant 34-year-old female with BRCA1 mutation and strong family history of breast cancer.  We discussed in detail BRCA and 1 mutation and the breast cancer risk.  Her breast cancer risk lifetime is anywhere between 53 to 72%.  Options were reviewed reviewed alternative mammogram and MRI every 6 months.  We also discussed the options of nipple sparing mastectomy with immediate reconstruction as well.  After extensive discussion she would like to have another child before considering any surgical options.  And I respect that decision.  With regard to the risk of ovarian cancer we will refer her to gynecology oncologist.  All patient's questions answered to her satisfaction we will see her in 3 months with a mammogram and ultrasound.    Advance Care Planning/Advance Directives:  ANIKET Shah MD discussed the disease status, and treatment plans with Elena Augustine today 11/22/24 and will follow-up with the patient.

## 2024-11-22 NOTE — PROGRESS NOTES
Chart rvw'd on 11/22/2024      Referred by:  Dr. Magdalena Shah    Diagnosis:  BRCA 1 gene mutation positive    Genetic Testing Date:  7/23/2024

## 2024-11-29 ENCOUNTER — HOSPITAL ENCOUNTER (OUTPATIENT)
Dept: MAMMOGRAPHY | Facility: MEDICAL CENTER | Age: 34
Discharge: HOME | End: 2024-11-29
Payer: COMMERCIAL

## 2024-11-29 VITALS — HEIGHT: 67 IN | BODY MASS INDEX: 36.88 KG/M2 | WEIGHT: 235 LBS

## 2024-11-29 DIAGNOSIS — Z12.31 ENCOUNTER FOR SCREENING MAMMOGRAM FOR HIGH-RISK PATIENT: ICD-10-CM

## 2024-11-29 DIAGNOSIS — Z15.01 BRCA1 GENE MUTATION POSITIVE: ICD-10-CM

## 2024-11-29 DIAGNOSIS — Z15.09 BRCA1 GENE MUTATION POSITIVE: ICD-10-CM

## 2024-11-29 PROCEDURE — 77067 SCR MAMMO BI INCL CAD: CPT

## 2024-11-29 PROCEDURE — 77063 BREAST TOMOSYNTHESIS BI: CPT

## 2024-12-19 ENCOUNTER — CONSULT (OUTPATIENT)
Dept: GYNECOLOGIC ONCOLOGY | Facility: CLINIC | Age: 34
End: 2024-12-19
Payer: COMMERCIAL

## 2024-12-19 VITALS
HEART RATE: 106 BPM | DIASTOLIC BLOOD PRESSURE: 86 MMHG | OXYGEN SATURATION: 98 % | BODY MASS INDEX: 36.57 KG/M2 | HEIGHT: 67 IN | TEMPERATURE: 97.8 F | WEIGHT: 233 LBS | SYSTOLIC BLOOD PRESSURE: 122 MMHG | RESPIRATION RATE: 18 BRPM

## 2024-12-19 DIAGNOSIS — Z15.01 BRCA1 GENE MUTATION POSITIVE: Primary | ICD-10-CM

## 2024-12-19 DIAGNOSIS — Z15.09 BRCA1 GENE MUTATION POSITIVE: Primary | ICD-10-CM

## 2024-12-19 PROCEDURE — 99204 OFFICE O/P NEW MOD 45 MIN: CPT | Performed by: OBSTETRICS & GYNECOLOGY

## 2024-12-19 NOTE — ASSESSMENT & PLAN NOTE
Patient has no evidence of malignancy at this point.  She has a BRCA 1 mutation.  We have discussed with the patient that her overall lifetime risk of ovarian cancer is approximately 45%.  This is compared to baseline of 1.5%.  We have recommended removal of bilateral tubes and ovaries at age of 35-40.  We could consider doing this now however the patient is still interested in fertility.  She does have some GYN issues with regard to menorrhagia which may result in hysterectomy at the time of risk reduction salpingo-oophorectomy.    At this point we have recommended pelvic ultrasound and .  We have discussed with the patient the inadequacy of screening for ovarian cancer with present techniques however this is the best that we have.    Will see the patient for virtual visit approximately 2 weeks after the ultrasound and blood work are completed.   unknown

## 2024-12-19 NOTE — PROGRESS NOTES
Assessment/Plan:    Problem List Items Addressed This Visit       BRCA1 gene mutation positive - Primary    Patient has no evidence of malignancy at this point.  She has a BRCA 1 mutation.  We have discussed with the patient that her overall lifetime risk of ovarian cancer is approximately 45%.  This is compared to baseline of 1.5%.  We have recommended removal of bilateral tubes and ovaries at age of 35-40.  We could consider doing this now however the patient is still interested in fertility.  She does have some GYN issues with regard to menorrhagia which may result in hysterectomy at the time of risk reduction salpingo-oophorectomy.    At this point we have recommended pelvic ultrasound and .  We have discussed with the patient the inadequacy of screening for ovarian cancer with present techniques however this is the best that we have.    Will see the patient for virtual visit approximately 2 weeks after the ultrasound and blood work are completed.         Relevant Orders    US pelvis complete w transvaginal         Additionally we have discussed oral contraceptives.  The patient is using the NuvaRing at this time.      CHIEF COMPLAINT: Newly diagnosed BRCA1 mutation for risk reduction for ovarian cancer            Patient ID: Elena Augustine is a 34 y.o. female  Patient seen for BRCA1 pathologic mutation.    Patient underwent BRCA testing due to family history.  She was recently diagnosed with BRCA1 mutation.  She has no malignancy at this time.  The patient is somewhat interested in maintaining fertility.  She has history of menorrhagia which she controls with a NuvaRing.  Ultrasound in early 2024 was unremarkable.    Today, the patient is doing well.  She denies significant abdominal pain, pelvic pain, nausea, vomiting, constipation, diarrhea, fevers, chills, or vaginal bleeding.           Review of Systems   Constitutional: Negative.    HENT: Negative.     Eyes: Negative.    Respiratory:  Negative.     Cardiovascular: Negative.    Gastrointestinal: Negative.    Endocrine: Negative.    Genitourinary: Negative.    Musculoskeletal: Negative.    Skin: Negative.    Neurological: Negative.    Hematological: Negative.    Psychiatric/Behavioral: Negative.         Current Outpatient Medications   Medication Sig Dispense Refill    Airsupra 90-80 MCG/ACT AERO Inhale 2 puffs if needed (coughing, wheezing) 10.7 g 3    BIOTIN MAXIMUM PO Take by mouth      diphenhydrAMINE HCl (BENADRYL ALLERGY PO) Take by mouth if needed      etonogestrel-ethinyl estradiol (NuvaRing) 0.12-0.015 MG/24HR vaginal ring Insert vaginally and leave in place for 3 consecutive weeks, then remove for 1 week. 3 each 3    multivitamin (THERAGRAN) TABS Take 1 tablet by mouth daily Flinstones with iron      Auvi-Q 0.3 MG/0.3ML SOAJ Inject 0.3 mL (0.3 mg total) into a muscle once for 1 dose (Patient not taking: Reported on 2024) 6 each 0    fluticasone (FLONASE) 50 mcg/act nasal spray SPRAY 1 SPRAY INTO EACH NOSTRIL EVERY DAY (Patient not taking: No sig reported) 48 mL 3     No current facility-administered medications for this visit.       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)        Past Surgical History:   Procedure Laterality Date    MOLE REMOVAL         OB History          3    Para   2    Term   2            AB   1    Living   2         SAB   1    IAB        Ectopic        Multiple        Live Births   2           Obstetric Comments   Menarche at age 12.5               Family History   Problem Relation Age of Onset    BRCA1 Positive Mother     Breast cancer Mother         BRCA +; 50s    Diabetes Mother     Rashes / Skin problems Mother         sores d/t  "scratching    Allergy (severe) Mother         food and env    Depression Father     Bipolar disorder Father     BRCA1 Positive Sister     Polycystic ovary syndrome Sister     Anxiety disorder Sister     Allergies Sister         soy allergy    BRCA1 Positive Maternal Grandmother     Stroke Maternal Grandmother     No Known Problems Maternal Grandfather     Heart disease Paternal Grandmother     Leukemia Paternal Grandfather 80    Diabetes Brother     Bipolar disorder Brother     Autism Brother     Allergies Brother         food and env.    Asthma Brother     BRCA1 Positive Son     Asthma Son     Allergies Son     No Known Problems Maternal Aunt     No Known Problems Paternal Aunt     Colon cancer Neg Hx     Ovarian cancer Neg Hx        The following portions of the patient's history were reviewed and updated as appropriate: allergies, current medications, past family history, past medical history, past social history, past surgical history, and problem list.      Objective:    Blood pressure 122/86, pulse (!) 106, temperature 97.8 °F (36.6 °C), resp. rate 18, height 5' 7\" (1.702 m), weight 106 kg (233 lb), last menstrual period 09/23/2024, SpO2 98%.  Body mass index is 36.49 kg/m².    Physical Exam  Constitutional:       Appearance: She is well-developed.   HENT:      Head: Normocephalic and atraumatic.   Eyes:      Pupils: Pupils are equal, round, and reactive to light.   Cardiovascular:      Rate and Rhythm: Normal rate and regular rhythm.      Heart sounds: Normal heart sounds.   Pulmonary:      Effort: Pulmonary effort is normal. No respiratory distress.      Breath sounds: Normal breath sounds.   Abdominal:      General: Bowel sounds are normal. There is no distension.      Palpations: Abdomen is soft. Abdomen is not rigid.      Tenderness: There is no abdominal tenderness. There is no guarding or rebound.   Genitourinary:     Comments: -Normal external female genitalia, normal Barth[place]'s and [place]'s glands     " "             -Normal midline urethral meatus. No lesions notes                  -Bladder without fullness mass or tenderness                  -Vagina without lesion or discharge No significant cystocele or rectocele noted                  -Cervix normal appearing without visible lesions                  -Uterus with normal contour, mobility. No tenderness,                  -Adnexae without  mass or tenderness                  - Anus without fissure of lesion    Musculoskeletal:         General: Normal range of motion.      Cervical back: Normal range of motion and neck supple.   Lymphadenopathy:      Cervical: No cervical adenopathy.      Upper Body:      Right upper body: No supraclavicular adenopathy.      Left upper body: No supraclavicular adenopathy.   Skin:     General: Skin is warm and dry.   Neurological:      Mental Status: She is alert and oriented to person, place, and time.   Psychiatric:         Behavior: Behavior normal.           No results found for: \"\"  Lab Results   Component Value Date    WBC 10.41 (H) 09/05/2024    HGB 10.5 (L) 09/05/2024    HCT 34.7 (L) 09/05/2024    MCV 63 (L) 09/05/2024     09/05/2024     Lab Results   Component Value Date    K 3.8 09/05/2024     09/05/2024    CO2 26 09/05/2024    BUN 17 09/05/2024    CREATININE 0.92 09/05/2024    CALCIUM 9.3 09/05/2024    AST 12 (L) 09/05/2024    ALT 19 09/05/2024    ALKPHOS 75 09/05/2024    EGFR 82 09/05/2024        Trend:  No results found for: \"\"     "

## 2024-12-20 ENCOUNTER — HOSPITAL ENCOUNTER (OUTPATIENT)
Dept: ULTRASOUND IMAGING | Facility: MEDICAL CENTER | Age: 34
Discharge: HOME | End: 2024-12-20
Payer: COMMERCIAL

## 2024-12-20 DIAGNOSIS — Z15.09 BRCA1 GENE MUTATION POSITIVE: ICD-10-CM

## 2024-12-20 DIAGNOSIS — Z15.01 BRCA1 GENE MUTATION POSITIVE: ICD-10-CM

## 2024-12-20 PROCEDURE — 76830 TRANSVAGINAL US NON-OB: CPT

## 2024-12-20 PROCEDURE — 76856 US EXAM PELVIC COMPLETE: CPT

## 2025-01-09 ENCOUNTER — TELEPHONE (OUTPATIENT)
Dept: GYNECOLOGIC ONCOLOGY | Facility: CLINIC | Age: 35
End: 2025-01-09

## 2025-01-09 NOTE — TELEPHONE ENCOUNTER
Called to remind patient she needs to go get bloodwork for her upcoming appointment with provider.

## 2025-01-13 ENCOUNTER — APPOINTMENT (OUTPATIENT)
Dept: LAB | Facility: MEDICAL CENTER | Age: 35
End: 2025-01-13
Payer: COMMERCIAL

## 2025-01-13 DIAGNOSIS — Z15.01 BRCA1 GENE MUTATION POSITIVE: ICD-10-CM

## 2025-01-13 DIAGNOSIS — Z15.09 BRCA1 GENE MUTATION POSITIVE: ICD-10-CM

## 2025-01-13 DIAGNOSIS — E66.812 CLASS 2 OBESITY DUE TO EXCESS CALORIES WITHOUT SERIOUS COMORBIDITY WITH BODY MASS INDEX (BMI) OF 36.0 TO 36.9 IN ADULT: ICD-10-CM

## 2025-01-13 DIAGNOSIS — Z86.32 HISTORY OF GESTATIONAL DIABETES: ICD-10-CM

## 2025-01-13 DIAGNOSIS — E66.09 CLASS 2 OBESITY DUE TO EXCESS CALORIES WITHOUT SERIOUS COMORBIDITY WITH BODY MASS INDEX (BMI) OF 36.0 TO 36.9 IN ADULT: ICD-10-CM

## 2025-01-13 LAB
CANCER AG125 SERPL-ACNC: 14.2 U/ML (ref 0–35)
CHOLEST SERPL-MCNC: 212 MG/DL (ref ?–200)
HDLC SERPL-MCNC: 49 MG/DL
LDLC SERPL CALC-MCNC: 136 MG/DL (ref 0–100)
NONHDLC SERPL-MCNC: 163 MG/DL
TRIGL SERPL-MCNC: 137 MG/DL (ref ?–150)

## 2025-01-13 PROCEDURE — 36415 COLL VENOUS BLD VENIPUNCTURE: CPT

## 2025-01-13 PROCEDURE — 80061 LIPID PANEL: CPT

## 2025-01-13 PROCEDURE — 86304 IMMUNOASSAY TUMOR CA 125: CPT

## 2025-01-14 ENCOUNTER — TELEMEDICINE (OUTPATIENT)
Dept: GYNECOLOGIC ONCOLOGY | Facility: CLINIC | Age: 35
End: 2025-01-14
Payer: COMMERCIAL

## 2025-01-14 ENCOUNTER — RESULTS FOLLOW-UP (OUTPATIENT)
Dept: BARIATRICS | Facility: CLINIC | Age: 35
End: 2025-01-14

## 2025-01-14 DIAGNOSIS — Z15.09 BRCA1 GENE MUTATION POSITIVE: Primary | ICD-10-CM

## 2025-01-14 DIAGNOSIS — Z15.01 BRCA1 GENE MUTATION POSITIVE: Primary | ICD-10-CM

## 2025-01-14 PROCEDURE — 99214 OFFICE O/P EST MOD 30 MIN: CPT | Performed by: OBSTETRICS & GYNECOLOGY

## 2025-01-14 NOTE — PROGRESS NOTES
Virtual Regular Visit  Name: Elena Augustine      : 1990      MRN: [id number]  Encounter Provider: Hema Gooden MD  Encounter Date: 2025   Encounter department: [facility] GYNECOLOGY ONCOLOGY [place]      Verification of patient location:  Patient is located at Home in the following state in which I hold an active license [place] :  Assessment & Plan  BRCA1 gene mutation positive  Patient is a very pleasant 34-year-old female with a history of BRCA1 mutation.  She has no evidence of malignancy at this point.  Screening tests are negative.  We discussed moving ahead with surgery at some point for risk reduction however the patient is not interested in this until after the age of 36.    Will therefore perform pelvic ultrasound and  next year.  Visit will be obtained at that time as well.    Patient was given symptoms of early ovarian cancer and symptoms to warrant a follow-up call.  She remains on the NuvaRing as well.    Orders:    US pelvis complete w transvaginal; Future    ; Future        Encounter provider Hema Gooden MD    The patient was identified by name and date of birth. Elena Augustine was informed that this is a telemedicine visit and that the visit is being conducted through the Epic Embedded platform. She agrees to proceed..  My office door was closed. No one else was in the room.  She acknowledged consent and understanding of privacy and security of the video platform. The patient has agreed to participate and understands they can discontinue the visit at any time.    Patient is aware this is a billable service.     History of Present Illness     Patient is a very pleasant 34-year-old female with a recently identified BRCA2 mutation.  She is undergoing risk reduction evaluation.  She was seen for exam a few weeks ago which was unremarkable.   was performed and this is 14.  Pelvic ultrasound was performed and reveals the  following:  FINDINGS:     UTERUS:  The uterus is retroverted in position, measuring 8.2 x 4.2 x 5.2 cm.  The uterus has a normal contour and echotexture.  Small amount of fluid in the endocervical canal, of very doubtful clinical significance.     ENDOMETRIUM:  The endometrial echo complex has an AP caliber of 7.0 mm.  Normal appearance.     OVARIES/ADNEXA:  Right ovary: 2.2 x 1.1 x 1.1 cm. 1.4 mL.  Ovarian Doppler flow is within normal limits.  No suspicious ovarian or adnexal abnormality.     Left ovary: 1.6 x 1.1 x 1.5 cm. 1.5 mL.  Ovarian Doppler flow is within normal limits.  No suspicious ovarian or adnexal abnormality.     OTHER:  No free fluid or loculated fluid collections.     IMPRESSION:     Unremarkable pelvic sonogram.    Today, the patient is doing well.  She denies significant abdominal pain, pelvic pain, nausea, vomiting, constipation, diarrhea, fevers, chills, or vaginal bleeding.         Review of Systems   Constitutional: Negative.    HENT: Negative.     Eyes: Negative.    Respiratory: Negative.     Cardiovascular: Negative.    Gastrointestinal: Negative.    Endocrine: Negative.    Genitourinary: Negative.    Musculoskeletal: Negative.    Skin: Negative.    Neurological: Negative.    Hematological: Negative.    Psychiatric/Behavioral: Negative.         Objective   There were no vitals taken for this visit.    Physical Exam  General: Patient appears well-developed. Patient is adequately nourished. Patient is not diaphoretic. Patient is not in distress.  Neck: Visualization of the neck demonstrates no grossly visible masses. Neck mobility is not compromised, neck appears supple.   HEENT: Oral mucosa appears moist. Patient does not identify palpable neck masses. Patient reports no oral tenderness or readily identifiable masses.  Eyes: Conjunctivae appear normal bilaterally. Right eye with no discharge. Left eye with no discharge. No evidence of scleral icterus. No evidence of  strabismus.  Respiratory: Respiratory effort appears normal. There is no respiratory distress. Patient able to speak in full sentences. There was no audible stridor or cough.  Abdomen: Patient states her abdomen is soft. States abdomen is non-tender. States abdomen is non-distended. Patient denies visible or palpable bulges to suggest hernias.  Musculoskeletal: Patient reports and I can confirm no visible deformities in 4 extremities. Patient reports and I can confirm full mobility in 4 extremities. There is no grossly visible limb edema. There is no evidence of clubbing or peripheral cyanosis.  Neurologic: Patient is fully alert and responsive. Patient is oriented to time, place and person. Gross evaluation of CNs III-IV-VI-VII-VIII and XI demonstrates no deficits. Patient reports normal gait and balance.  Skin: My evaluation of exposed skin areas reveals no evidence of pallor. My evaluation of exposed skin areas reveals no obvious rashes. My evaluation of exposed skin areas reveals no grossly visible lesions. My evaluation of exposed skin areas reveals no evidence of erythema.  Psychiatric / Behavioral: Patient's mood and affect appears normal. Patient's judgement is preserved. Patient is coherent and thought content appears directionally and contextually appropriate for age and health status.   Visit Time  Total Visit Duration: 25 min

## 2025-01-14 NOTE — ASSESSMENT & PLAN NOTE
Patient is a very pleasant 34-year-old female with a history of BRCA1 mutation.  She has no evidence of malignancy at this point.  Screening tests are negative.  We discussed moving ahead with surgery at some point for risk reduction however the patient is not interested in this until after the age of 36.    Will therefore perform pelvic ultrasound and  next year.  Visit will be obtained at that time as well.    Patient was given symptoms of early ovarian cancer and symptoms to warrant a follow-up call.  She remains on the NuvaRing as well.    Orders:    US pelvis complete w transvaginal; Future    ; Future

## 2025-01-22 ENCOUNTER — TELEPHONE (OUTPATIENT)
Dept: BARIATRICS | Facility: CLINIC | Age: 35
End: 2025-01-22

## 2025-02-28 ENCOUNTER — OFFICE VISIT (OUTPATIENT)
Dept: SURGICAL ONCOLOGY | Facility: CLINIC | Age: 35
End: 2025-02-28
Payer: COMMERCIAL

## 2025-02-28 VITALS
HEART RATE: 106 BPM | DIASTOLIC BLOOD PRESSURE: 90 MMHG | SYSTOLIC BLOOD PRESSURE: 126 MMHG | HEIGHT: 68 IN | OXYGEN SATURATION: 98 % | BODY MASS INDEX: 35.31 KG/M2 | WEIGHT: 233 LBS

## 2025-02-28 DIAGNOSIS — Z80.3 FAMILY HISTORY OF BREAST CANCER IN MOTHER: ICD-10-CM

## 2025-02-28 DIAGNOSIS — Z15.09 BRCA1 GENE MUTATION POSITIVE: Primary | ICD-10-CM

## 2025-02-28 DIAGNOSIS — Z15.01 BRCA1 GENE MUTATION POSITIVE: Primary | ICD-10-CM

## 2025-02-28 PROCEDURE — 99204 OFFICE O/P NEW MOD 45 MIN: CPT | Performed by: SURGERY

## 2025-02-28 RX ORDER — LORAZEPAM 0.5 MG/1
0.5 TABLET ORAL EVERY 8 HOURS PRN
Qty: 2 TABLET | Refills: 0 | Status: SHIPPED | OUTPATIENT
Start: 2025-02-28

## 2025-02-28 NOTE — PROGRESS NOTES
Surgical Oncology Follow Up  [facility]  [facility]  [address] [facility] [address]    Elena Fawn  1990  [id number]      Chief Complaint   Patient presents with   • Follow-up     3 Month Follow Up        Assessment & Plan:   34-year-old-year-old female with strong family history is breast cancer and BRCA1 mutation she is here after her mammogram.  She denies of any breast pain nipple discharge nipple retraction or skin changes.  We have reviewed the mammogram films and agree with the reports.  We will order her MRI 6 months from last mammogram and follow her.  She was told to call us with any questions or concerns in the interim she understand and agreed to do so.    Cancer History:     Oncology History    No history exists.         Interval History:   Follow-up with BRCA1 mutation    Review of Systems:   Review of Systems   Constitutional:  Negative for chills and fever.   HENT:  Negative for ear pain and sore throat.    Eyes:  Negative for pain and visual disturbance.   Respiratory:  Negative for cough and shortness of breath.    Cardiovascular:  Negative for chest pain and palpitations.   Gastrointestinal:  Negative for abdominal pain and vomiting.   Genitourinary:  Negative for dysuria and hematuria.   Musculoskeletal:  Negative for arthralgias and back pain.   Skin:  Negative for color change and rash.   Neurological:  Negative for seizures and syncope.   All other systems reviewed and are negative.    Past Medical History     Patient Active Problem List   Diagnosis   • Anxiety   • Class 2 obesity due to excess calories without serious comorbidity with body mass index (BMI) of 36.0 to 36.9 in adult   • Menorrhagia with regular cycle   • Scalp lesion   • Mild intermittent asthma without complication   • Family history of breast cancer in mother   • Plantar fasciitis of left foot   • Beta thalassemia minor   • History of gestational  diabetes   • BRCA1 gene mutation positive     Past Medical History:   Diagnosis Date   • BRCA1 positive    • Diabetes (HCC)     in pregnancy   • Food intolerance    • 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)      Past Surgical History:   Procedure Laterality Date   • MOLE REMOVAL       Family History   Problem Relation Age of Onset   • BRCA1 Positive Mother    • Breast cancer Mother         BRCA +; 50s   • Diabetes Mother    • Rashes / Skin problems Mother         sores d/t scratching   • Allergy (severe) Mother         food and env   • Depression Father    • Bipolar disorder Father    • BRCA1 Positive Sister    • Polycystic ovary syndrome Sister    • Anxiety disorder Sister    • Allergies Sister         soy allergy   • BRCA1 Positive Maternal Grandmother    • Stroke Maternal Grandmother    • No Known Problems Maternal Grandfather    • Heart disease Paternal Grandmother    • Leukemia Paternal Grandfather 80   • Diabetes Brother    • Bipolar disorder Brother    • Autism Brother    • Allergies Brother         food and env.   • Asthma Brother    • BRCA1 Positive Son    • Asthma Son    • Allergies Son    • No Known Problems Maternal Aunt    • No Known Problems Paternal Aunt    • Colon cancer Neg Hx    • Ovarian cancer Neg Hx      Social History     Socioeconomic History   • Marital status: /Civil Union     Spouse name: Not on file   • Number of children: Not on file   • Years of education: Not on file   • Highest education level: Not on file   Occupational History   • Not on file   Tobacco Use   • Smoking status: Never   • Smokeless tobacco: Never   Vaping Use   • Vaping status: Never Used   Substance and Sexual Activity   • Alcohol use: Yes     Comment: social   • Drug use: Never   • Sexual activity: Yes     Partners: Male     Birth control/protection: Condom Male, Ring   Other Topics Concern   • Not on file   Social History Narrative    Do you have pets? dog and cat Is pet allowed in  bedroom?Yes    Are you a smoker? Never    Does anyone smoke in your home? No       Do you live with smokers? No    [place] frequently? No   How many times a year? N/A      Social Drivers of Health     Financial Resource Strain: Not on file   Food Insecurity: Not on file   Transportation Needs: Not on file   Physical Activity: Not on file   Stress: Not on file   Social Connections: Not on file   Intimate Partner Violence: Not on file   Housing Stability: Not on file       Current Outpatient Medications:   •  Airsupra 90-80 MCG/ACT AERO, Inhale 2 puffs if needed (coughing, wheezing), Disp: 10.7 g, Rfl: 3  •  BIOTIN MAXIMUM PO, Take by mouth, Disp: , Rfl:   •  diphenhydrAMINE HCl (BENADRYL ALLERGY PO), Take by mouth if needed, Disp: , Rfl:   •  etonogestrel-ethinyl estradiol (NuvaRing) 0.12-0.015 MG/24HR vaginal ring, Insert vaginally and leave in place for 3 consecutive weeks, then remove for 1 week., Disp: 3 each, Rfl: 3  •  multivitamin (THERAGRAN) TABS, Take 1 tablet by mouth daily Flinstones with iron, Disp: , Rfl:   •  Auvi-Q 0.3 MG/0.3ML SOAJ, Inject 0.3 mL (0.3 mg total) into a muscle once for 1 dose (Patient not taking: Reported on 1/29/2024), Disp: 6 each, Rfl: 0  •  fluticasone (FLONASE) 50 mcg/act nasal spray, SPRAY 1 SPRAY INTO EACH NOSTRIL EVERY DAY (Patient not taking: No sig reported), Disp: 48 mL, Rfl: 3  Allergies   Allergen Reactions   • Other Swelling     Whole wheat/grain products - can have regular white flour/grains       Physical Exam:     Vitals:    02/28/25 1024   BP: 126/90   Pulse: (!) 106   SpO2: 98%     Physical Exam  Constitutional:       Appearance: Normal appearance.   HENT:      Head: Normocephalic and atraumatic.      Nose: Nose normal.      Mouth/Throat:      Mouth: Mucous membranes are moist.   Eyes:      Pupils: Pupils are equal, round, and reactive to light.   Cardiovascular:      Rate and Rhythm: Normal rate.      Pulses: Normal pulses.      Heart sounds: Normal heart  sounds.   Pulmonary:      Effort: Pulmonary effort is normal.      Breath sounds: Normal breath sounds.   Chest:      Comments: Bilateral breast no palpable mass masses nipple discharge nipple retraction or skin changes.  Bilateral axillary and supraclavicular examination no palpable adenopathy.  Patient was examined seated as well as supine position.  Abdominal:      General: Bowel sounds are normal.      Palpations: Abdomen is soft.   Musculoskeletal:         General: Normal range of motion.      Cervical back: Normal range of motion and neck supple.   Skin:     General: Skin is warm.   Neurological:      General: No focal deficit present.      Mental Status: She is alert and oriented to person, place, and time.   Psychiatric:         Mood and Affect: Mood normal.         Behavior: Behavior normal.         Thought Content: Thought content normal.         Judgment: Judgment normal.       Results & Discussion:   DIAGNOSIS: BRCA1 gene mutation positive; Encounter for screening mammogram for high-risk patient      TECHNIQUE:  Digital screening mammography was performed. Computer Aided Detection (CAD) analyzed all applicable images.   COMPARISONS: None available.     RELEVANT HISTORY:   Family Breast Cancer History: History of breast cancer in Mother.  Family Medical History: Family medical history includes BRCA1 Positive in 4 relatives (maternal grandmother, mother, sister, son) and breast cancer in mother.   Personal History: Hormone history includes birth control. No known relevant surgical history. Medical history includes BRCA 1 positive.     The patient is scheduled in a reminder system for screening mammography.     8-10% of cancers will be missed on mammography. Management of a palpable abnormality must be based on clinical grounds.  Patients will be notified of their results via letter from our facility. Accredited by [employer] and FDA.     RISK ASSESSMENT:   5 Year Maryann: 7.17%  10  Year Tyrer-Cuzick: 17.73%  Lifetime Tyrer-Cuzick: 71.85%     TISSUE DENSITY:   The breasts are almost entirely fatty.      INDICATION: Elena Augustine is a 34 y.o. female presenting for screening mammography.     FINDINGS:   There are no suspicious masses, grouped microcalcifications or areas of architectural distortion. The skin and nipple areolar complex are unremarkable.     IMPRESSION:  No mammographic evidence of malignancy.     This patient has been identified as being at elevated risk for development of breast cancer based on the Tyrer-Cuzick model. She may benefit from supplemental screening.     ASSESSMENT/BI-RADS CATEGORY:  Left: 1 - Negative  Right: 1 - Negative  Overall: 1 - Negative     RECOMMENDATION:       - Routine screening mammogram in 1 year for both breasts.     Narrative & Impression   PELVIC ULTRASOUND, COMPLETE     INDICATION: The patient is 34 years old. Z15.01: Genetic susceptibility to malignant neoplasm of breast  Z15.09: Genetic susceptibility to other malignant neoplasm. History of BRCA1 mutation.     COMPARISON: None     TECHNIQUE: Transabdominal pelvic ultrasound was performed in sagittal and transverse planes with a curvilinear transducer. Additional transvaginal imaging was performed to better evaluate the endometrium and ovaries. Imaging included volumetric sweeps as   well as traditional still imaging technique.     FINDINGS:     UTERUS:  The uterus is retroverted in position, measuring 8.2 x 4.2 x 5.2 cm.  The uterus has a normal contour and echotexture.  Small amount of fluid in the endocervical canal, of very doubtful clinical significance.     ENDOMETRIUM:  The endometrial echo complex has an AP caliber of 7.0 mm.  Normal appearance.     OVARIES/ADNEXA:  Right ovary: 2.2 x 1.1 x 1.1 cm. 1.4 mL.  Ovarian Doppler flow is within normal limits.  No suspicious ovarian or adnexal abnormality.     Left ovary: 1.6 x 1.1 x 1.5 cm. 1.5 mL.  Ovarian Doppler flow is within normal  limits.  No suspicious ovarian or adnexal abnormality.     OTHER:  No free fluid or loculated fluid collections.     IMPRESSION:     Unremarkable pelvic sonogram.       I did review mammogram films and agree with the report.  We also reviewed the report from pelvic ultrasound .I did discussed in detail nature of breast cancer with BRCA mutation.  Will continue to follow every 6 months with mammogram with MRI alternatively.  We have discussed in the past breast reduction mastectomies patient would like to wait and I respect her decision.  she understands and  agrees . All patient questions were answered.       Advance Care Planning/Advance Directives:  I Magdalena Shah MD discussed the disease status with Elena Augustine  today 02/28/25  treatment plans and follow-up with the patient.

## 2025-04-22 ENCOUNTER — OFFICE VISIT (OUTPATIENT)
Dept: BARIATRICS | Facility: CLINIC | Age: 35
End: 2025-04-22
Payer: COMMERCIAL

## 2025-04-22 VITALS
HEART RATE: 103 BPM | HEIGHT: 68 IN | RESPIRATION RATE: 16 BRPM | SYSTOLIC BLOOD PRESSURE: 141 MMHG | TEMPERATURE: 98.4 F | BODY MASS INDEX: 34.86 KG/M2 | WEIGHT: 230 LBS | DIASTOLIC BLOOD PRESSURE: 89 MMHG

## 2025-04-22 DIAGNOSIS — E16.1 HYPERINSULINEMIA: ICD-10-CM

## 2025-04-22 DIAGNOSIS — E66.811 CLASS 1 OBESITY DUE TO EXCESS CALORIES WITHOUT SERIOUS COMORBIDITY WITH BODY MASS INDEX (BMI) OF 34.0 TO 34.9 IN ADULT: Primary | ICD-10-CM

## 2025-04-22 DIAGNOSIS — E66.09 CLASS 1 OBESITY DUE TO EXCESS CALORIES WITHOUT SERIOUS COMORBIDITY WITH BODY MASS INDEX (BMI) OF 34.0 TO 34.9 IN ADULT: Primary | ICD-10-CM

## 2025-04-22 PROCEDURE — 99214 OFFICE O/P EST MOD 30 MIN: CPT | Performed by: PHYSICIAN ASSISTANT

## 2025-04-22 RX ORDER — TIRZEPATIDE 2.5 MG/.5ML
2.5 INJECTION, SOLUTION SUBCUTANEOUS WEEKLY
Qty: 2 ML | Refills: 0 | Status: SHIPPED | OUTPATIENT
Start: 2025-04-22 | End: 2025-05-20

## 2025-04-22 NOTE — ASSESSMENT & PLAN NOTE
-Patient is pursuing conservative plan and met with RD prior  -Initial weight loss goal of 5-10% weight loss for improved health  -Screening labs and records reviewed from prior. Recommend checking glucose, A1c, insulin, tsh, lipids. Had GDM prior.   - STOP BANG-3/8    Initial Weight:231  Current Weight: 230  Goal Weight:180    To start on zepbound. To start on initial dose of medication and then to titrate as tolerated.  They have tried more than 6 months of lifestyle modifications including diet and activity changes and has had insignificant weight loss of less than 1 lb a week. Patient denies personal and family history of MCT and MEN2 tumors. Patient denies personal history of pancreatitis. Side effects discussed but not limited to diarrhea, bloating, constipation, GI upset, heartburn, increased heart rate, headache, low blood sugar, fatigue and dizziness. Titration and medication administration discussed.  Discussed stopping medication 2 months prior to trying to conceive.  Contraception Nuvaring    To continue with exercise and try to stay consistent  Continue water intake and well rounded low calorie diet

## 2025-04-22 NOTE — PATIENT INSTRUCTIONS
Call or message after the 2nd injection  If intending to get pregnant stop the medication 2 months before

## 2025-04-22 NOTE — PROGRESS NOTES
Assessment/Plan:    Class 1 obesity due to excess calories without serious comorbidity with body mass index (BMI) of 34.0 to 34.9 in adult  -Patient is pursuing conservative plan and met with RD prior  -Initial weight loss goal of 5-10% weight loss for improved health  -Screening labs and records reviewed from prior. Recommend checking glucose, A1c, insulin, tsh, lipids. Had GDM prior.   - STOP BANG-3/8    Initial Weight:231  Current Weight: 230  Goal Weight:180    To start on zepbound. To start on initial dose of medication and then to titrate as tolerated.  They have tried more than 6 months of lifestyle modifications including diet and activity changes and has had insignificant weight loss of less than 1 lb a week. Patient denies personal and family history of MCT and MEN2 tumors. Patient denies personal history of pancreatitis. Side effects discussed but not limited to diarrhea, bloating, constipation, GI upset, heartburn, increased heart rate, headache, low blood sugar, fatigue and dizziness. Titration and medication administration discussed.  Discussed stopping medication 2 months prior to trying to conceive.  Contraception Nuvaring    To continue with exercise and try to stay consistent  Continue water intake and well rounded low calorie diet        Hyperinsulinemia  To start on zepbound. Also discussed possible metformin        Return in about 3 months (around 7/22/2025).       Diagnoses and all orders for this visit:    Class 1 obesity due to excess calories without serious comorbidity with body mass index (BMI) of 34.0 to 34.9 in adult  -     tirzepatide (Zepbound) 2.5 mg/0.5 mL auto-injector; Inject 0.5 mL (2.5 mg total) under the skin once a week for 28 days    Hyperinsulinemia          Subjective:   Chief Complaint   Patient presents with    Follow-up     MWM-6M F/u; Waist-45.5in        Patient ID: Elena Augustine  is a 34 y.o. female with excess weight/obesity here to pursue weight managment.   Patient is pursuing Conservative Program.     HPI  Last seen for consult in August. Saw RD who recommended protein tracking. She gained 6 lb but then lost the weight.      She is eating a gluten free diet.  Staying active , gets 10,000 steps daily  Wt Readings from Last 10 Encounters:   04/22/25 104 kg (230 lb)   02/28/25 106 kg (233 lb)   02/03/25 104 kg (230 lb)   12/19/24 106 kg (233 lb)   11/29/24 107 kg (235 lb)   11/22/24 107 kg (235 lb)   09/03/24 105 kg (231 lb)   08/26/24 105 kg (231 lb)   08/01/24 105 kg (231 lb)   05/17/24 105 kg (231 lb 6.4 oz)       Food logging:    Increased appetite/cravings:  Exercise:Yoga, HIIT for 30 minutes 3-5 days per week  Hydration:64oz water, 1 cup coffee with splash or chocolate or regular milk, unsweetened iced tea (rare)       The following portions of the patient's history were reviewed and updated as appropriate: She  has a past medical history of BRCA1 positive, Diabetes (HCC), Food intolerance, and 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  She   Patient Active Problem List    Diagnosis Date Noted    Hyperinsulinemia 04/22/2025    BRCA1 gene mutation positive 10/07/2024    Beta thalassemia minor 08/26/2024    History of gestational diabetes 08/26/2024    Plantar fasciitis of left foot 05/17/2024    Family history of breast cancer in mother 04/26/2024    Mild intermittent asthma without complication 01/29/2024    Menorrhagia with regular cycle 03/29/2023    Scalp lesion 03/29/2023    Class 1 obesity due to excess calories without serious comorbidity with body mass index (BMI) of 34.0 to 34.9 in adult 03/28/2023    Anxiety 06/10/2020     She  has a past surgical history that includes Mole removal.  Her family history includes Allergies in her brother, sister, and son; Allergy (severe) in her mother; Anxiety disorder in her sister; Asthma in her brother and son; Autism in her brother; BRCA1 Positive in her maternal grandmother, mother, sister,  and son; Bipolar disorder in her brother and father; Breast cancer in her mother; Depression in her father; Diabetes in her brother and mother; Heart disease in her paternal grandmother; Leukemia (age of onset: 80) in her paternal grandfather; No Known Problems in her maternal aunt, maternal grandfather, and paternal aunt; Polycystic ovary syndrome in her sister; Rashes / Skin problems in her mother; Stroke in her maternal grandmother.  She  reports that she has never smoked. She has never used smokeless tobacco. She reports current alcohol use. She reports that she does not use drugs.  Current Outpatient Medications   Medication Sig Dispense Refill    Airsupra 90-80 MCG/ACT AERO Inhale 2 puffs if needed (coughing, wheezing) 10.7 g 3    BIOTIN MAXIMUM PO Take by mouth      diphenhydrAMINE HCl (BENADRYL ALLERGY PO) Take by mouth if needed      etonogestrel-ethinyl estradiol (NuvaRing) 0.12-0.015 MG/24HR vaginal ring Insert vaginally and leave in place for 3 consecutive weeks, then remove for 1 week. 3 each 3    fluticasone (FLONASE) 50 mcg/act nasal spray 2 sprays into each nostril daily 48 mL 3    LORazepam (Ativan) 0.5 mg tablet Take 1 tablet (0.5 mg total) by mouth every 8 (eight) hours as needed for anxiety (Prior to MRI) 2 tablet 0    multivitamin (THERAGRAN) TABS Take 1 tablet by mouth daily Flinstones with iron      tirzepatide (Zepbound) 2.5 mg/0.5 mL auto-injector Inject 0.5 mL (2.5 mg total) under the skin once a week for 28 days 2 mL 0    Auvi-Q 0.3 MG/0.3ML SOAJ Inject 0.3 mL (0.3 mg total) into a muscle once for 1 dose (Patient not taking: Reported on 1/29/2024) 6 each 0     No current facility-administered medications for this visit.     Current Outpatient Medications on File Prior to Visit   Medication Sig    Airsupra 90-80 MCG/ACT AERO Inhale 2 puffs if needed (coughing, wheezing)    BIOTIN MAXIMUM PO Take by mouth    diphenhydrAMINE HCl (BENADRYL ALLERGY PO) Take by mouth if needed     "etonogestrel-ethinyl estradiol (NuvaRing) 0.12-0.015 MG/24HR vaginal ring Insert vaginally and leave in place for 3 consecutive weeks, then remove for 1 week.    fluticasone (FLONASE) 50 mcg/act nasal spray 2 sprays into each nostril daily    LORazepam (Ativan) 0.5 mg tablet Take 1 tablet (0.5 mg total) by mouth every 8 (eight) hours as needed for anxiety (Prior to MRI)    multivitamin (THERAGRAN) TABS Take 1 tablet by mouth daily Flinstones with iron    Auvi-Q 0.3 MG/0.3ML SOAJ Inject 0.3 mL (0.3 mg total) into a muscle once for 1 dose (Patient not taking: Reported on 1/29/2024)     No current facility-administered medications on file prior to visit.     She is allergic to other..    Review of Systems    Objective:    /89   Pulse 103   Temp 98.4 °F (36.9 °C)   Resp 16   Ht 5' 8\" (1.727 m)   Wt 104 kg (230 lb)   BMI 34.97 kg/m²      Physical Exam    "

## 2025-04-23 ENCOUNTER — TELEPHONE (OUTPATIENT)
Dept: BARIATRICS | Facility: CLINIC | Age: 35
End: 2025-04-23

## 2025-04-23 NOTE — TELEPHONE ENCOUNTER
PA for Zepbound 2.5mg SUBMITTED to [facility]     via    [x]CMM-KEY: [id number]  []Surescripts-Case ID #   []Availity-Auth ID # NDC #   []Faxed to plan   []Other website   []Phone call Case ID #     []PA sent as URGENT    All office notes, labs and other pertaining documents and studies sent. Clinical questions answered. Awaiting determination from insurance company.     Turnaround time for your insurance to make a decision on your Prior Authorization can take 7-21 business days.

## 2025-04-23 NOTE — TELEPHONE ENCOUNTER
PA for Zepbound APPROVED     Date(s) approved 4/23/2025 - 12/19/2025    Case #    Patient advised by          [x]GiPS[employer] Message  []Phone call   []LMOM  []L/M to call office as no active Communication consent on file  []Unable to leave detailed message as VM not approved on Communication consent       Pharmacy advised by    [x]Fax  []Phone call  []Secure Chat    Specialty Pharmacy    []     Approval letter scanned into Media Yes

## 2025-05-07 ENCOUNTER — TELEPHONE (OUTPATIENT)
Dept: OBGYN CLINIC | Facility: MEDICAL CENTER | Age: 35
End: 2025-05-07

## 2025-05-16 ENCOUNTER — TELEPHONE (OUTPATIENT)
Dept: BARIATRICS | Facility: CLINIC | Age: 35
End: 2025-05-16

## 2025-05-16 DIAGNOSIS — E66.811 CLASS 1 OBESITY DUE TO EXCESS CALORIES WITHOUT SERIOUS COMORBIDITY WITH BODY MASS INDEX (BMI) OF 34.0 TO 34.9 IN ADULT: Primary | ICD-10-CM

## 2025-05-16 DIAGNOSIS — E16.1 HYPERINSULINEMIA: ICD-10-CM

## 2025-05-16 DIAGNOSIS — E66.09 CLASS 1 OBESITY DUE TO EXCESS CALORIES WITHOUT SERIOUS COMORBIDITY WITH BODY MASS INDEX (BMI) OF 34.0 TO 34.9 IN ADULT: Primary | ICD-10-CM

## 2025-05-16 RX ORDER — TIRZEPATIDE 5 MG/.5ML
5 INJECTION, SOLUTION SUBCUTANEOUS WEEKLY
Qty: 2 ML | Refills: 1 | Status: SHIPPED | OUTPATIENT
Start: 2025-05-16 | End: 2025-07-11

## 2025-05-16 NOTE — TELEPHONE ENCOUNTER
Patient is requesting the next dose up of her Zepbound    How are you tolerating the medication?   [] Nausea  [] Vomiting  [] Diarrhea  [] Asymptomatic  [x] Other: Acid Reflux    Last visit weight: 230 lbs    Current weight: 225 lbs    Date of last injection: 05/16/2025    How many injections do you have left: 0

## 2025-05-20 DIAGNOSIS — Z30.44 ENCOUNTER FOR SURVEILLANCE OF VAGINAL RING HORMONAL CONTRACEPTIVE DEVICE: ICD-10-CM

## 2025-05-20 DIAGNOSIS — N92.0 MENORRHAGIA WITH REGULAR CYCLE: ICD-10-CM

## 2025-05-20 RX ORDER — ETONOGESTREL AND ETHINYL ESTRADIOL .12; .015 MG/D; MG/D
RING VAGINAL
Qty: 3 EACH | Refills: 3 | Status: SHIPPED | OUTPATIENT
Start: 2025-05-20

## 2025-05-31 ENCOUNTER — HOSPITAL ENCOUNTER (OUTPATIENT)
Dept: RADIOLOGY | Facility: HOSPITAL | Age: 35
Discharge: HOME | End: 2025-05-31
Attending: SURGERY
Payer: COMMERCIAL

## 2025-05-31 DIAGNOSIS — Z15.01 BRCA1 GENE MUTATION POSITIVE: ICD-10-CM

## 2025-05-31 DIAGNOSIS — Z15.09 BRCA1 GENE MUTATION POSITIVE: ICD-10-CM

## 2025-05-31 PROCEDURE — A9585 GADOBUTROL INJECTION: HCPCS | Performed by: SURGERY

## 2025-05-31 PROCEDURE — C8908 MRI W/O FOL W/CONT, BREAST,: HCPCS

## 2025-05-31 PROCEDURE — 77049 MRI BREAST C-+ W/CAD BI: CPT

## 2025-05-31 RX ORDER — GADOBUTROL 604.72 MG/ML
10 INJECTION INTRAVENOUS
Status: COMPLETED | OUTPATIENT
Start: 2025-05-31 | End: 2025-05-31

## 2025-05-31 RX ADMIN — GADOBUTROL 10 ML: 604.72 INJECTION INTRAVENOUS at 14:34

## 2025-06-10 PROBLEM — Z91.89 AT HIGH RISK FOR BREAST CANCER: Status: ACTIVE | Noted: 2025-06-10

## 2025-07-10 ENCOUNTER — TELEPHONE (OUTPATIENT)
Dept: SURGICAL ONCOLOGY | Facility: CLINIC | Age: 35
End: 2025-07-10

## 2025-07-10 NOTE — TELEPHONE ENCOUNTER
Left message regarding appointment with Dr. Shah on 7/11/25. Left hopeline number for return call.     Please transfer to Vida Marte when patient calls back.

## 2025-07-11 ENCOUNTER — OFFICE VISIT (OUTPATIENT)
Dept: SURGICAL ONCOLOGY | Facility: CLINIC | Age: 35
End: 2025-07-11
Payer: COMMERCIAL

## 2025-07-11 VITALS
HEIGHT: 68 IN | SYSTOLIC BLOOD PRESSURE: 112 MMHG | WEIGHT: 217 LBS | HEART RATE: 112 BPM | BODY MASS INDEX: 32.89 KG/M2 | DIASTOLIC BLOOD PRESSURE: 86 MMHG | OXYGEN SATURATION: 99 %

## 2025-07-11 DIAGNOSIS — Z15.09 BRCA1 GENE MUTATION POSITIVE: Primary | ICD-10-CM

## 2025-07-11 DIAGNOSIS — Z80.3 FAMILY HISTORY OF BREAST CANCER IN MOTHER: ICD-10-CM

## 2025-07-11 DIAGNOSIS — Z15.01 BRCA1 GENE MUTATION POSITIVE: Primary | ICD-10-CM

## 2025-07-11 DIAGNOSIS — Z91.89 AT HIGH RISK FOR BREAST CANCER: ICD-10-CM

## 2025-07-11 PROCEDURE — 99215 OFFICE O/P EST HI 40 MIN: CPT | Performed by: SURGERY

## 2025-07-11 NOTE — PROGRESS NOTES
Name: Elena Augustine      : 1990      MRN: [id number]  Encounter Provider: Magdalena Shah MD  Encounter Date: 2025   Encounter department: [facility]  :  Assessment & Plan  BRCA1 gene mutation positive    Orders:    Mammo screening bilateral w 3d and cad; Future    Family history of breast cancer in mother         At high risk for breast cancer         35-year-old female with BRCA1 mutation with strong family history of breast cancer.  She is here after her screening mammogram.  She denies of any breast pain nipple discharge nipple retraction or skin changes.  MRI was reviewed no concerns.  However patient had significant claustrophobia even though being on antianxiety medications.  We also discussed prophylactic mastectomy with immediate reconstruction options versus alternate with mammogram and automated breast ultrasound every 6 months as she is not being able to undergo MRI of the breast .  We will order mammogram and see her in 6 months.  She was told to call us with any questions or concerns in the interim she understand and agreed to do so        History of Present Illness   Elena Augustine is a 35 y.o. year old female who presents for follow-up with BRCA1 mutation after her MRI of the breast.       Review of Systems   Constitutional:  Negative for chills and fever.   HENT:  Negative for ear pain and sore throat.    Eyes:  Negative for pain and visual disturbance.   Respiratory:  Negative for cough and shortness of breath.    Cardiovascular:  Negative for chest pain and palpitations.   Gastrointestinal:  Negative for abdominal pain and vomiting.   Genitourinary:  Negative for dysuria and hematuria.   Musculoskeletal:  Negative for arthralgias and back pain.   Skin:  Negative for color change and rash.   Neurological:  Negative for seizures and syncope.   All other systems reviewed and are negative.   A complete review of systems is  "negative other than that noted above in the HPI.    Medical History Reviewed by provider this encounter:  Tobacco  Allergies  Meds  Problems  Med Hx  Surg Hx  Fam Hx     .       Objective   /86 (BP Location: Left arm, Patient Position: Sitting)   Pulse (!) 112   Ht 5' 8\" (1.727 m)   Wt 98.4 kg (217 lb)   SpO2 99%   BMI 32.99 kg/m²     Pain Screening:     ECOG    Physical Exam  Constitutional:       Appearance: Normal appearance.   HENT:      Head: Normocephalic and atraumatic.      Nose: Nose normal.      Mouth/Throat:      Mouth: Mucous membranes are moist.     Eyes:      Pupils: Pupils are equal, round, and reactive to light.       Cardiovascular:      Rate and Rhythm: Normal rate.      Pulses: Normal pulses.      Heart sounds: Normal heart sounds.   Pulmonary:      Effort: Pulmonary effort is normal.      Breath sounds: Normal breath sounds.   Chest:      Comments: Bilateral breast examination no palpable mass masses nipple discharge nipple retraction or skin changes.  Bilateral axillary supraclavicular adenopathy.  Patient was examined seated as well as supine position.  Abdominal:      General: Bowel sounds are normal.      Palpations: Abdomen is soft.     Musculoskeletal:         General: Normal range of motion.      Cervical back: Normal range of motion and neck supple.     Skin:     General: Skin is warm.     Neurological:      General: No focal deficit present.      Mental Status: She is alert and oriented to person, place, and time.     Psychiatric:         Mood and Affect: Mood normal.         Behavior: Behavior normal.         Thought Content: Thought content normal.         Judgment: Judgment normal.          Labs: I have reviewed pertinent labs. No results found for: \"WBC\", \"RBC\", \"HGB\", \"HCT\", \"MCV\", \"MCH\", \"RDW\", \"PLT\", \"NEUTOPHILPCT\", \"BANDSPCT\", \"LYMPHOPCT\", \"MONOPCT\", \"EOSPCT\", \"BASOPCT\", \"IMMGRANS\", \"NEUTROABS\"   No results found for: \"SODIUM\", \"K\", \"CL\", \"CO2\", \"AGAP\", \"BUN\", " "\"CREATININE\", \"GLUC\", \"GLUF\", \"CALCIUM\", \"CORRECTEDCA\", \"AST\", \"ALT\", \"ALKPHOS\", \"TP\", \"ALB\", \"TBILI\", \"EGFR\"   No visits with results within 1 Month(s) from this visit.   Latest known visit with results is:   Appointment on 01/13/2025   Component Date Value Ref Range Status     01/13/2025 14.2  0.0 - 35.0 U/mL Final    [facility] Access chemiluminescent immunoassay. Results cannot be interpreted for the presence or absence of malignant disease. Confirm baseline values for patients being serially monitored.              Cholesterol 01/13/2025 212 (H)  See Comment mg/dL Final    Cholesterol:         Pediatric <18 Years        Desirable          <170 mg/dL      Borderline High    170-199 mg/dL      High               >=200 mg/dL        Adult >=18 Years            Desirable         <200 mg/dL      Borderline High   200-239 mg/dL      High              >239 mg/dL      Triglycerides 01/13/2025 137  See Comment mg/dL Final    Triglyceride:     0-9Y            <75mg/dL     10Y-17Y         <90 mg/dL       >=18Y     Normal          <150 mg/dL     Borderline High 150-199 mg/dL     High            200-499 mg/dL        Very High       >499 mg/dL    Specimen collection should occur prior to Metamizole administration due to the potential for falsely depressed results.    HDL, Direct 01/13/2025 49 (L)  >=50 mg/dL Final    LDL Calculated 01/13/2025 136 (H)  0 - 100 mg/dL Final    LDL Cholesterol:     Optimal           <100 mg/dl     Near Optimal      100-129 mg/dl     Above Optimal       Borderline High 130-159 mg/dl       High            160-189 mg/dl       Very High       >189 mg/dl         This screening LDL is a calculated result.   It does not have the accuracy of the Direct Measured LDL in the monitoring of patients with hyperlipidemia and/or statin therapy.   Direct Measure LDL (SUV019) must be ordered separately in these patients.    Non-HDL-Chol (CHOL-HDL) 01/13/2025 163  mg/dl Final     Pathology: I have " reviewed pathology reports described above.    Radiology Results Review: I personally reviewed the following image studies in PACS and associated radiology reports: Breast MRI. My interpretation of the radiology images/reports is: No concerns for abnormal mass masses calcifications or enhancement.  Will repeat her mammogram in 6 months..  [place]: yes    Administrative Statements   I have spent a total time of 40 minutes in caring for this patient on the day of the visit/encounter including Diagnostic results, Prognosis, Risks and benefits of tx options, Instructions for management, Patient and family education, Risk factor reductions, Impressions, Counseling / Coordination of care, Documenting in the medical record, Reviewing/placing orders in the medical record (including tests, medications, and/or procedures), and Obtaining or reviewing history  .

## 2025-07-12 DIAGNOSIS — E66.811 CLASS 1 OBESITY DUE TO EXCESS CALORIES WITHOUT SERIOUS COMORBIDITY WITH BODY MASS INDEX (BMI) OF 34.0 TO 34.9 IN ADULT: ICD-10-CM

## 2025-07-12 DIAGNOSIS — E16.1 HYPERINSULINEMIA: ICD-10-CM

## 2025-07-12 DIAGNOSIS — E66.09 CLASS 1 OBESITY DUE TO EXCESS CALORIES WITHOUT SERIOUS COMORBIDITY WITH BODY MASS INDEX (BMI) OF 34.0 TO 34.9 IN ADULT: ICD-10-CM

## 2025-07-14 RX ORDER — TIRZEPATIDE 5 MG/.5ML
INJECTION, SOLUTION SUBCUTANEOUS
Qty: 2 ML | Refills: 0 | Status: SHIPPED | OUTPATIENT
Start: 2025-07-14

## 2025-07-15 ENCOUNTER — TELEPHONE (OUTPATIENT)
Age: 35
End: 2025-07-15

## 2025-08-13 ENCOUNTER — TELEPHONE (OUTPATIENT)
Dept: BARIATRICS | Facility: CLINIC | Age: 35
End: 2025-08-13

## 2025-08-22 ENCOUNTER — TELEPHONE (OUTPATIENT)
Dept: BARIATRICS | Facility: CLINIC | Age: 35
End: 2025-08-22